# Patient Record
Sex: FEMALE | Race: ASIAN | NOT HISPANIC OR LATINO | Employment: OTHER | URBAN - METROPOLITAN AREA
[De-identification: names, ages, dates, MRNs, and addresses within clinical notes are randomized per-mention and may not be internally consistent; named-entity substitution may affect disease eponyms.]

---

## 2021-03-27 ENCOUNTER — AMBULATORY - HEALTHEAST (OUTPATIENT)
Dept: NURSING | Facility: CLINIC | Age: 56
End: 2021-03-27

## 2021-12-08 ENCOUNTER — IMMUNIZATION (OUTPATIENT)
Dept: NURSING | Facility: CLINIC | Age: 56
End: 2021-12-08
Payer: COMMERCIAL

## 2021-12-08 PROCEDURE — 91300 PR COVID VAC PFIZER DIL RECON 30 MCG/0.3 ML IM: CPT

## 2021-12-08 PROCEDURE — 0004A PR COVID VAC PFIZER DIL RECON 30 MCG/0.3 ML IM: CPT

## 2022-07-24 ENCOUNTER — HEALTH MAINTENANCE LETTER (OUTPATIENT)
Age: 57
End: 2022-07-24

## 2022-08-29 ENCOUNTER — OFFICE VISIT (OUTPATIENT)
Dept: FAMILY MEDICINE | Facility: CLINIC | Age: 57
End: 2022-08-29
Payer: COMMERCIAL

## 2022-08-29 VITALS
SYSTOLIC BLOOD PRESSURE: 131 MMHG | TEMPERATURE: 97.9 F | DIASTOLIC BLOOD PRESSURE: 81 MMHG | RESPIRATION RATE: 16 BRPM | WEIGHT: 127.4 LBS | HEART RATE: 63 BPM

## 2022-08-29 DIAGNOSIS — D50.9 MICROCYTIC ANEMIA: Primary | ICD-10-CM

## 2022-08-29 DIAGNOSIS — Z12.31 ENCOUNTER FOR SCREENING MAMMOGRAM FOR MALIGNANT NEOPLASM OF BREAST: ICD-10-CM

## 2022-08-29 DIAGNOSIS — E05.00 GRAVES DISEASE: ICD-10-CM

## 2022-08-29 DIAGNOSIS — Z11.4 ENCOUNTER FOR SCREENING FOR HIV: ICD-10-CM

## 2022-08-29 DIAGNOSIS — F41.8 DEPRESSION WITH ANXIETY: ICD-10-CM

## 2022-08-29 DIAGNOSIS — Z12.11 COLON CANCER SCREENING: ICD-10-CM

## 2022-08-29 DIAGNOSIS — Z13.220 LIPID SCREENING: ICD-10-CM

## 2022-08-29 DIAGNOSIS — Z23 ENCOUNTER FOR IMMUNIZATION: ICD-10-CM

## 2022-08-29 DIAGNOSIS — E55.9 VITAMIN D DEFICIENCY: ICD-10-CM

## 2022-08-29 PROBLEM — E05.90 HYPERTHYROIDISM: Status: ACTIVE | Noted: 2019-10-03

## 2022-08-29 PROBLEM — F51.01 INSOMNIA, IDIOPATHIC: Status: ACTIVE | Noted: 2022-07-13

## 2022-08-29 LAB
ALBUMIN SERPL BCG-MCNC: 4.4 G/DL (ref 3.5–5.2)
ALP SERPL-CCNC: 117 U/L (ref 35–104)
ALT SERPL W P-5'-P-CCNC: 24 U/L (ref 10–35)
ANION GAP SERPL CALCULATED.3IONS-SCNC: 10 MMOL/L (ref 7–15)
AST SERPL W P-5'-P-CCNC: 33 U/L (ref 10–35)
BILIRUB SERPL-MCNC: 0.9 MG/DL
BUN SERPL-MCNC: 8.8 MG/DL (ref 6–20)
CALCIUM SERPL-MCNC: 10.1 MG/DL (ref 8.6–10)
CHLORIDE SERPL-SCNC: 103 MMOL/L (ref 98–107)
CHOLEST SERPL-MCNC: 190 MG/DL
CREAT SERPL-MCNC: 0.55 MG/DL (ref 0.51–0.95)
DEPRECATED HCO3 PLAS-SCNC: 28 MMOL/L (ref 22–29)
ERYTHROCYTE [DISTWIDTH] IN BLOOD BY AUTOMATED COUNT: 17.8 % (ref 10–15)
FERRITIN SERPL-MCNC: 109 NG/ML (ref 11–328)
GFR SERPL CREATININE-BSD FRML MDRD: >90 ML/MIN/1.73M2
GLUCOSE SERPL-MCNC: 104 MG/DL (ref 70–99)
HCT VFR BLD AUTO: 43.3 % (ref 35–47)
HDLC SERPL-MCNC: 50 MG/DL
HGB BLD-MCNC: 14.6 G/DL (ref 11.7–15.7)
IRON BINDING CAPACITY (ROCHE): 321 UG/DL (ref 240–430)
IRON SATN MFR SERPL: 31 % (ref 15–46)
IRON SERPL-MCNC: 101 UG/DL (ref 37–145)
LDLC SERPL CALC-MCNC: 112 MG/DL
MCH RBC QN AUTO: 26.3 PG (ref 26.5–33)
MCHC RBC AUTO-ENTMCNC: 33.7 G/DL (ref 31.5–36.5)
MCV RBC AUTO: 78 FL (ref 78–100)
NONHDLC SERPL-MCNC: 140 MG/DL
PLATELET # BLD AUTO: 139 10E3/UL (ref 150–450)
POTASSIUM SERPL-SCNC: 4.4 MMOL/L (ref 3.4–5.3)
PROT SERPL-MCNC: 7.6 G/DL (ref 6.4–8.3)
RBC # BLD AUTO: 5.55 10E6/UL (ref 3.8–5.2)
SODIUM SERPL-SCNC: 141 MMOL/L (ref 136–145)
T4 FREE SERPL-MCNC: 2 NG/DL (ref 0.9–1.7)
TRIGL SERPL-MCNC: 138 MG/DL
WBC # BLD AUTO: 4.7 10E3/UL (ref 4–11)

## 2022-08-29 PROCEDURE — 83550 IRON BINDING TEST: CPT | Performed by: FAMILY MEDICINE

## 2022-08-29 PROCEDURE — 84443 ASSAY THYROID STIM HORMONE: CPT | Performed by: FAMILY MEDICINE

## 2022-08-29 PROCEDURE — 83540 ASSAY OF IRON: CPT | Performed by: FAMILY MEDICINE

## 2022-08-29 PROCEDURE — 99203 OFFICE O/P NEW LOW 30 MIN: CPT | Performed by: FAMILY MEDICINE

## 2022-08-29 PROCEDURE — 80053 COMPREHEN METABOLIC PANEL: CPT | Performed by: FAMILY MEDICINE

## 2022-08-29 PROCEDURE — 87389 HIV-1 AG W/HIV-1&-2 AB AG IA: CPT | Performed by: FAMILY MEDICINE

## 2022-08-29 PROCEDURE — 82306 VITAMIN D 25 HYDROXY: CPT | Performed by: FAMILY MEDICINE

## 2022-08-29 PROCEDURE — 83021 HEMOGLOBIN CHROMOTOGRAPHY: CPT | Performed by: FAMILY MEDICINE

## 2022-08-29 PROCEDURE — 85027 COMPLETE CBC AUTOMATED: CPT | Performed by: FAMILY MEDICINE

## 2022-08-29 PROCEDURE — 82728 ASSAY OF FERRITIN: CPT | Performed by: FAMILY MEDICINE

## 2022-08-29 PROCEDURE — 36415 COLL VENOUS BLD VENIPUNCTURE: CPT | Performed by: FAMILY MEDICINE

## 2022-08-29 PROCEDURE — 80061 LIPID PANEL: CPT | Performed by: FAMILY MEDICINE

## 2022-08-29 PROCEDURE — 83020 HEMOGLOBIN ELECTROPHORESIS: CPT | Performed by: FAMILY MEDICINE

## 2022-08-29 PROCEDURE — 84439 ASSAY OF FREE THYROXINE: CPT | Performed by: FAMILY MEDICINE

## 2022-08-29 RX ORDER — FERROUS SULFATE 325(65) MG
TABLET ORAL
COMMUNITY
Start: 2022-07-13 | End: 2022-09-13

## 2022-08-29 RX ORDER — TRAZODONE HYDROCHLORIDE 50 MG/1
TABLET, FILM COATED ORAL
COMMUNITY
Start: 2022-07-13 | End: 2023-02-09 | Stop reason: ALTCHOICE

## 2022-08-29 RX ORDER — MELOXICAM 15 MG/1
TABLET ORAL
COMMUNITY
Start: 2022-08-06 | End: 2023-02-09

## 2022-08-29 RX ORDER — METOPROLOL SUCCINATE 25 MG/1
TABLET, EXTENDED RELEASE ORAL
COMMUNITY
Start: 2022-07-18 | End: 2022-08-29

## 2022-08-29 RX ORDER — CETIRIZINE HYDROCHLORIDE 10 MG/1
10 TABLET ORAL
COMMUNITY
End: 2023-02-09

## 2022-08-29 RX ORDER — MIRTAZAPINE 7.5 MG/1
7.5 TABLET, FILM COATED ORAL AT BEDTIME
Qty: 90 TABLET | Refills: 1 | Status: SHIPPED | OUTPATIENT
Start: 2022-08-29 | End: 2023-02-09

## 2022-08-29 RX ORDER — METHIMAZOLE 10 MG/1
TABLET ORAL
COMMUNITY
Start: 2022-07-14 | End: 2023-07-18

## 2022-08-29 RX ORDER — FLUOXETINE 10 MG/1
CAPSULE ORAL
COMMUNITY
Start: 2022-07-13 | End: 2022-08-29

## 2022-08-29 RX ORDER — ACETAMINOPHEN 160 MG
TABLET,DISINTEGRATING ORAL
COMMUNITY
Start: 2022-07-13 | End: 2023-02-09

## 2022-08-29 ASSESSMENT — PATIENT HEALTH QUESTIONNAIRE - PHQ9
SUM OF ALL RESPONSES TO PHQ QUESTIONS 1-9: 11
10. IF YOU CHECKED OFF ANY PROBLEMS, HOW DIFFICULT HAVE THESE PROBLEMS MADE IT FOR YOU TO DO YOUR WORK, TAKE CARE OF THINGS AT HOME, OR GET ALONG WITH OTHER PEOPLE: SOMEWHAT DIFFICULT
SUM OF ALL RESPONSES TO PHQ QUESTIONS 1-9: 11

## 2022-08-29 NOTE — PROGRESS NOTES
[facility] IN-OFFICE Visit  In Person : Sammi     Assessment/Plan:  Microcytic anemia  Continue iron supplement for now and continue workup as below.  Pt declines colonoscopy as recommended but will do a cologard.  Consider hemoccult as well to see if blood in stool.    - CBC with platelets  - Hemoglobinopathy/Thalassemia Cascade  - Ferritin  - Iron and iron binding capacity  - Comprehensive metabolic panel (BMP + Alb, Alk Phos, ALT, AST, Total. Bili, TP)  - COLOGUARD([employer])  - CBC with platelets  - Hemoglobinopathy/Thalassemia Cascade  - Ferritin  - Iron and iron binding capacity  - Comprehensive metabolic panel (BMP + Alb, Alk Phos, ALT, AST, Total. Bili, TP)    Vitamin D deficiency  Check level and replace as needed  - Vitamin D Deficiency  - Vitamin D Deficiency    Graves disease  Back on methimazole 10mg daily with improved symptoms.  Follow-up with endocrine later this year- labs today as indicated.    - TSH  - T4, free  - TSH  - T4, free    Encounter for screening mammogram for malignant neoplasm of breast  - *MA Screening Digital Bilateral    Colon cancer screening  As above- cologard    Lipid screening  - Lipid Profile (Chol, Trig, HDL, LDL calc)  - Lipid Profile (Chol, Trig, HDL, LDL calc)    Encounter for screening for HIV  - HIV Antigen Antibody Combo  - HIV Antigen Antibody Combo    Depression with anxiety  Hasn't seen mental health prescriber in over a year due to  and ride issues.  Increase fluoxetine from 10 to 20mg daily.  Add remeron to bedtime due to depressed symptoms, poor sleep and poor appetite requesting help with appetite today.    - FLUoxetine (PROZAC) 20 MG capsule  Dispense: 90 capsule; Refill: 4  - mirtazapine (REMERON) 7.5 MG tablet  Dispense: 90 tablet; Refill: 1  - Med Therapy Management Referral    Encounter for immunization  Pt declined covid booster, shingrix, adacel, flu offered today.       Return in about 3 months (around  11/29/2022) for Health Maintenance Visit.        Subjective   Norberto Gunter is a 57 year old accompanied by her daughter, presenting for the following health issues:  RECHECK (Pt is here today for follow up on her blood counts)  doctor at [facility] has retired and she needs a new PCP   Also see's Dr Josiah Vela for her thyroid - FM at [facility]     Has been back on her methimazole 10mg daily for 2.5 months now.    Taking iron daily for past couple of months as well because she was told she has low iron and anemia.      Notes she took her 1 month of metoprolol and now her heart is better now that she restarted her thyroid pill so she stopped taking this one now.      Takes meloxicam 15mg as needed for body aches and pain- takes 3-4 days/week only 1/day.      Does take OTC allergy meds as needed.      Still taking depression med and sleep med as needed.  Sees a therapist on [employer] and [place] but hasn't really seen anyone for her med management since 2021 due to phone and  issues.      Has follow-up with endocrine with ike 12/2022    Declines covid booster today     History of Present Illness       Reason for visit:  Just a check up on a new symptom I ve been feeling.  Symptom onset:  3-4 weeks ago    She eats 4 or more servings of fruits and vegetables daily.She consumes 3 sweetened beverage(s) daily.She exercises with enough effort to increase her heart rate 20 to 29 minutes per day.  She exercises with enough effort to increase her heart rate 3 or less days per week.   She is taking medications regularly.    Today's PHQ-9         PHQ-9 Total Score: 11    PHQ-9 Q9 Thoughts of better off dead/self-harm past 2 weeks :   Not at all    How difficult have these problems made it for you to do your work, take care of things at home, or get along with other people: Somewhat difficult     Review of Systems       Objective    /81 (BP Location: Right arm, Patient Position: Sitting, Cuff Size: Adult  Regular)   Pulse 63   Temp 97.9  F (36.6  C)   Resp 16   Wt 57.8 kg (127 lb 6.4 oz)   LMP  (Approximate)   There is no height or weight on file to calculate BMI.  Physical Exam   Wt Readings from Last 3 Encounters:   08/29/22 57.8 kg (127 lb 6.4 oz)       No LMP recorded (approximate). (Menstrual status: Irregular Periods).    All normal as below except abnormalities include: grossly normal exam  Pterygium noted- not crossing pupil.  General is a  57 year old sitting comfortably in no apparent distress wearing a mask   HEENT:  TM are clear bilaterally.  Eye exams within normal   Neck: Supple without lymphadenopathy or thyromegally  CV: Regular rate and rhythm S1S2 without rubs, murmurs or gallops,   Lungs: Clear to auscultation bilaterally  Abd:  +BS, soft NT/ND,  No masses or organomegally,   Extremities: Warm, No Edema, 2+ Pedal and radial pulses bilaterally  Skin: No lesions or rashes noted  Neuro: Able to ambulate around the exam room with equal movement, strength and normal coordination of the upper and lower extremeties symmetrically    History summarized from1-2:Cardiology visit 7/18/22 with Wilfredina reviewed- seen for palpitations in setting of Grave's disease and started on metoprolol xl 1/2 of 25mg daily.  Acute marylou 2019 and found to have a. Fib.  Echo showed biatrial enlargement with EF 50-55%.  Hyperthyroid dx made at this time.  Started on asa and b-blocker.  Methimazole started.  Follow-up 2021 showed bradycardia so b-blocker was stopped and asa stopped due to low Chads2 score.  7/2022 seen by pcp and found to be hyperthyroid again with symptoms.  Restarted on methimazole and asked to follow-up with endocrine. She was having increased SOB and palpitations that get better on thyroid pill. Consider holter monitor in future to see if she does go into afib at times.       Old Records-1: Outside allergies, meds, problems and immunizations were reconciled as needed from [facility]  Lab tests  reviewed-1: hgb 10.7 with MCV 76     Nava Tran MD              .  ..

## 2022-08-30 LAB
DEPRECATED CALCIDIOL+CALCIFEROL SERPL-MC: 38 UG/L (ref 20–75)
HIV 1+2 AB+HIV1 P24 AG SERPL QL IA: NONREACTIVE

## 2022-09-02 LAB
HEMOGLOBIN A2 QUANTITATION: 2.8 % (ref 2.2–3.5)
HEMOGLOBIN ELECTROPHRESIS: NORMAL
HEMOGLOBIN F QUANTITATION: <0.5 % (ref 0–2)
REVIEWING PATHOLOGIST: NORMAL

## 2022-09-13 DIAGNOSIS — D69.6 THROMBOCYTOPENIA (H): ICD-10-CM

## 2022-09-13 DIAGNOSIS — D50.9 MICROCYTIC ANEMIA: Primary | ICD-10-CM

## 2022-09-13 RX ORDER — FERROUS SULFATE 325(65) MG
325 TABLET ORAL EVERY OTHER DAY
Qty: 45 TABLET | Refills: 0 | Status: SHIPPED | OUTPATIENT
Start: 2022-09-13 | End: 2023-02-09

## 2022-09-13 NOTE — RESULT ENCOUNTER NOTE
Team - please call patient with results and send result letter with this information if patient desires for their records.     Her anemia is better- keep eating healthy and taking her thyroid pill. She can cut back her iron to every other day and maybe stop soon if her blood is healthy with her next check.      Her vitamin D is better- keep taking her vitamin D     Her thyroid is still a little over active but better - keep taking her thyroid pill daily and see her thyroid specialist later this fall as scheduled.      Her cholesterol is healthy   Her sugar, liver and kidney tests are healthy

## 2022-09-14 ENCOUNTER — TELEPHONE (OUTPATIENT)
Dept: FAMILY MEDICINE | Facility: CLINIC | Age: 57
End: 2022-09-14

## 2022-09-14 NOTE — TELEPHONE ENCOUNTER
RN called pt with the help of a thania  regarding Dr. Tran message below. Provider message relay to pt.    Pt verbalizes understanding, agrees with plan and has no further questions.    Closing encounter.    ELPIDIO Bower, RN   [facility]    ----- Message from Nava Tran MD sent at 9/13/2022 11:30 AM CDT -----    Team - please call patient with results and send result letter with this information if patient desires for their records.     Her anemia is better- keep eating healthy and taking her thyroid pill. She can cut back her iron to every other day and maybe stop soon if her blood is healthy with her next check.      Her vitamin D is better- keep taking her vitamin D     Her thyroid is still a little over active but better - keep taking her thyroid pill daily and see her thyroid specialist later this fall as scheduled.      Her cholesterol is healthy   Her sugar, liver and kidney tests are healthy

## 2022-10-02 ENCOUNTER — HEALTH MAINTENANCE LETTER (OUTPATIENT)
Age: 57
End: 2022-10-02

## 2022-10-17 ENCOUNTER — TELEPHONE (OUTPATIENT)
Dept: FAMILY MEDICINE | Facility: CLINIC | Age: 57
End: 2022-10-17

## 2022-10-17 NOTE — TELEPHONE ENCOUNTER
MTM referral from: [facility] visit (referral by provider)    MTM referral outreach attempt #2 on October 17, 2022 at 2:08 PM      Outcome: Patient not reachable after several attempts, will route to [facility] Pharmacist/Provider as an FYI.  [facility] scheduling number is [phone].  Thank you for the referral.    Irina Pino [facility]

## 2023-01-26 ENCOUNTER — ALLIED HEALTH/NURSE VISIT (OUTPATIENT)
Dept: FAMILY MEDICINE | Facility: CLINIC | Age: 58
End: 2023-01-26
Payer: COMMERCIAL

## 2023-01-26 DIAGNOSIS — Z23 IMMUNIZATION DUE: Primary | ICD-10-CM

## 2023-01-26 PROCEDURE — 90471 IMMUNIZATION ADMIN: CPT

## 2023-01-26 PROCEDURE — 0124A COVID-19 VACCINE BIVALENT BOOSTER 12+ (PFIZER): CPT

## 2023-01-26 PROCEDURE — 91312 COVID-19 VACCINE BIVALENT BOOSTER 12+ (PFIZER): CPT

## 2023-01-26 PROCEDURE — 99207 PR NO CHARGE NURSE ONLY: CPT

## 2023-01-26 PROCEDURE — 90682 RIV4 VACC RECOMBINANT DNA IM: CPT

## 2023-02-09 ENCOUNTER — OFFICE VISIT (OUTPATIENT)
Dept: FAMILY MEDICINE | Facility: CLINIC | Age: 58
End: 2023-02-09
Payer: COMMERCIAL

## 2023-02-09 VITALS
RESPIRATION RATE: 16 BRPM | DIASTOLIC BLOOD PRESSURE: 92 MMHG | HEIGHT: 60 IN | HEART RATE: 74 BPM | SYSTOLIC BLOOD PRESSURE: 146 MMHG | TEMPERATURE: 98 F | BODY MASS INDEX: 25.52 KG/M2 | WEIGHT: 130 LBS

## 2023-02-09 DIAGNOSIS — E55.9 VITAMIN D DEFICIENCY: ICD-10-CM

## 2023-02-09 DIAGNOSIS — D69.6 THROMBOCYTOPENIA (H): ICD-10-CM

## 2023-02-09 DIAGNOSIS — E05.00 GRAVES DISEASE: ICD-10-CM

## 2023-02-09 DIAGNOSIS — F32.1 CURRENT MODERATE EPISODE OF MAJOR DEPRESSIVE DISORDER WITHOUT PRIOR EPISODE (H): Primary | ICD-10-CM

## 2023-02-09 DIAGNOSIS — R92.8 ABNORMAL MAMMOGRAM: ICD-10-CM

## 2023-02-09 DIAGNOSIS — Z11.59 NEED FOR HEPATITIS B SCREENING TEST: ICD-10-CM

## 2023-02-09 DIAGNOSIS — D50.9 MICROCYTIC ANEMIA: ICD-10-CM

## 2023-02-09 DIAGNOSIS — J30.9 ALLERGIC RHINITIS, UNSPECIFIED SEASONALITY, UNSPECIFIED TRIGGER: ICD-10-CM

## 2023-02-09 LAB
ERYTHROCYTE [DISTWIDTH] IN BLOOD BY AUTOMATED COUNT: 13.3 % (ref 10–15)
HBA1C MFR BLD: 5.8 % (ref 0–5.6)
HCT VFR BLD AUTO: 42.2 % (ref 35–47)
HGB BLD-MCNC: 14 G/DL (ref 11.7–15.7)
MCH RBC QN AUTO: 29.4 PG (ref 26.5–33)
MCHC RBC AUTO-ENTMCNC: 33.2 G/DL (ref 31.5–36.5)
MCV RBC AUTO: 89 FL (ref 78–100)
PLATELET # BLD AUTO: 149 10E3/UL (ref 150–450)
RBC # BLD AUTO: 4.76 10E6/UL (ref 3.8–5.2)
T4 FREE SERPL-MCNC: 0.88 NG/DL (ref 0.9–1.7)
TSH SERPL DL<=0.005 MIU/L-ACNC: 1.61 UIU/ML (ref 0.3–4.2)
WBC # BLD AUTO: 5.7 10E3/UL (ref 4–11)

## 2023-02-09 PROCEDURE — 87340 HEPATITIS B SURFACE AG IA: CPT | Performed by: FAMILY MEDICINE

## 2023-02-09 PROCEDURE — 84443 ASSAY THYROID STIM HORMONE: CPT | Performed by: FAMILY MEDICINE

## 2023-02-09 PROCEDURE — 84439 ASSAY OF FREE THYROXINE: CPT | Performed by: FAMILY MEDICINE

## 2023-02-09 PROCEDURE — 99214 OFFICE O/P EST MOD 30 MIN: CPT | Performed by: FAMILY MEDICINE

## 2023-02-09 PROCEDURE — 85027 COMPLETE CBC AUTOMATED: CPT | Performed by: FAMILY MEDICINE

## 2023-02-09 PROCEDURE — 83036 HEMOGLOBIN GLYCOSYLATED A1C: CPT | Performed by: FAMILY MEDICINE

## 2023-02-09 PROCEDURE — 86706 HEP B SURFACE ANTIBODY: CPT | Performed by: FAMILY MEDICINE

## 2023-02-09 PROCEDURE — 36415 COLL VENOUS BLD VENIPUNCTURE: CPT | Performed by: FAMILY MEDICINE

## 2023-02-09 RX ORDER — CETIRIZINE HYDROCHLORIDE 10 MG/1
10 TABLET ORAL DAILY PRN
Qty: 90 TABLET | Refills: 4 | Status: SHIPPED | OUTPATIENT
Start: 2023-02-09 | End: 2023-07-13

## 2023-02-09 RX ORDER — MIRTAZAPINE 7.5 MG/1
7.5 TABLET, FILM COATED ORAL AT BEDTIME
Qty: 90 TABLET | Refills: 3 | Status: SHIPPED | OUTPATIENT
Start: 2023-02-09 | End: 2023-04-10

## 2023-02-09 RX ORDER — ACETAMINOPHEN 160 MG
2000 TABLET,DISINTEGRATING ORAL DAILY
Qty: 90 CAPSULE | Refills: 4 | Status: SHIPPED | OUTPATIENT
Start: 2023-02-09 | End: 2023-07-13

## 2023-02-09 ASSESSMENT — PATIENT HEALTH QUESTIONNAIRE - PHQ9
SUM OF ALL RESPONSES TO PHQ QUESTIONS 1-9: 19
10. IF YOU CHECKED OFF ANY PROBLEMS, HOW DIFFICULT HAVE THESE PROBLEMS MADE IT FOR YOU TO DO YOUR WORK, TAKE CARE OF THINGS AT HOME, OR GET ALONG WITH OTHER PEOPLE: VERY DIFFICULT
SUM OF ALL RESPONSES TO PHQ QUESTIONS 1-9: 19

## 2023-02-09 NOTE — PROGRESS NOTES
[facility] Visit  Phone : Sammi    Assessment/Plan:  Microcytic anemia  Labs normal today- resolved.    - CBC with platelets  - CBC with platelets    Thrombocytopenia (H)  Stable and very minimal at this point- continue to monitor    - CBC with platelets  - CBC with platelets    Graves disease  Working with [facility] endocrine.  Recheck labs today and have her follow-up with endocrine.  Currently on methimazole 10mg daily  - TSH  - T4, free  - Hemoglobin A1c  - TSH  - T4, free  - Hemoglobin A1c    Need for hepatitis B screening test  - Hepatitis B Surface Antibody  - Hepatitis B surface antigen  - Hepatitis B Surface Antibody  - Hepatitis B surface antigen    Abnormal mammogram  Needs help rescheduling follow-up imaging.    - MA Diagnostic Digital Left  - US Breast Left Limited 1-3 Quadrants    Current moderate episode of major depressive disorder without prior episode (H)  Off meds for a period of time (weeks vs months?)  Restart mirtazapine 7.5mg at bedtime and  Fluoxetine 20mg daily.  Reviewed depression action plan with pt today and sent to [facility] for her family to have.    - mirtazapine (REMERON) 7.5 MG tablet  Dispense: 90 tablet; Refill: 3  - PRIMARY CARE FOLLOW-UP SCHEDULING    Vitamin D deficiency  Refill as requested- currently off  - Cholecalciferol (VITAMIN D3) 50 MCG (2000 UT) CAPS  Dispense: 90 capsule; Refill: 4    Allergic rhinitis, unspecified seasonality, unspecified trigger  Refill as requested.    - cetirizine (ZYRTEC) 10 MG tablet  Dispense: 90 tablet; Refill: 4      Return in about 6 weeks (around 3/23/2023) for Health Maintenance Visit.        Natalia Gunter is a 57 year old accompanied by her self, presenting for the following health issues:  RECHECK (Pt here for follow for thyroid.)    Due to recheck thyroid labs- taking her thyroid pill every day.  Methimazole 10mg daily.     Wants help getting in with a mental health provider.      Ran out of her  mental health meds about 2 weeks ago- wants refills as they were helping her.  Pt notes she has 1 pill that she takes at bedtime.  Not sure what it is.  It is the medication that I gave to her.  Has been helping with sleep.  Thinks she ran out of her depression pill- fluoxetine daily.      Iron- not sure if she is taking this one or not.  Thinks she ran out of these.       Was supposed to get anoh    History of Present Illness       Reason for visit:  Thyroid    She eats 2-3 servings of fruits and vegetables daily.She consumes 0 sweetened beverage(s) daily.She exercises with enough effort to increase her heart rate 9 or less minutes per day.  She exercises with enough effort to increase her heart rate 3 or less days per week.   She is taking medications regularly.    Today's PHQ-9         PHQ-9 Total Score: 19    PHQ-9 Q9 Thoughts of better off dead/self-harm past 2 weeks :   Several days  Thoughts of suicide or self harm: (P) No  Self-harm Plan:     Self-harm Action:       Safety concerns for self or others: (P) No    How difficult have these problems made it for you to do your work, take care of things at home, or get along with other people: Very difficult         Depression Screening Follow Up    PHQ 2/9/2023   PHQ-9 Total Score 19   Q9: Thoughts of better off dead/self-harm past 2 weeks Several days   F/U: Thoughts of suicide or self-harm -   F/U: Safety concerns -           Follow Up      Follow Up Actions Taken  Crisis resource information provided in the After Visit Summary  restart medications- fu in 6 weeks    Discussed the following ways the patient can remain in a safe environment:  be around others  Depression Screening Follow-up    PHQ 2/9/2023   PHQ-9 Total Score 19   Q9: Thoughts of better off dead/self-harm past 2 weeks Several days   F/U: Thoughts of suicide or self-harm -   F/U: Safety concerns -       Does the patient currently have a mental health provider?  No  Follow Up Actions Taken  Mental  "Health Referral pended/placed     Nava Tran MD      Review of Systems         Objective    BP (!) 146/92 (BP Location: Left arm, Patient Position: Sitting, Cuff Size: Adult Regular)   Pulse 74   Temp 98  F (36.7  C)   Resp 16   Ht 1.52 m (4' 11.84\")   Wt 59 kg (130 lb)   BMI 25.52 kg/m    Body mass index is 25.52 kg/m .  Physical Exam   Wt Readings from Last 3 Encounters:   02/09/23 59 kg (130 lb)   08/29/22 57.8 kg (127 lb 6.4 oz)       No LMP recorded. Patient is premenopausal.    All normal as below except abnormalities include: pt appears well.  Good eye contact.  Dressed appropriately for season and occaision with evidence of healthy grooming.    General is a  57 year old sitting comfortably in no apparent distress wearing a mask   HEENT:   Eye exams within normal   Neck: Supple without lymphadenopathy or thyromegally  Extremities: Warm, No Edema, 2+ Pedal and radial pulses bilaterally  Skin: No lesions or rashes noted  Neuro: Able to ambulate around the exam room with equal movement, strength and normal coordination of the upper and lower extremeties symmetrically    History summarized from1-2:Endocrine visit at [facility] fall 2022- thyroid level is almost back to normal.  Recheck in 2months.    Old Records-1: Outside allergies, meds, problems and immunizations were reconciled as needed from [facility]  Radiology tests reviewed-1: Mammogram with incomplete images- due for recheck.    Lab tests reviewed-1: 2022    Nava Tran MD                  "

## 2023-02-09 NOTE — LETTER
My Depression Action Plan  Name: Norberto Onofre   Date of Birth 1965  Date: 2/14/2023    My doctor: Nava Tran   My clinic: [facility] [place] [id number] [phone]          GREEN    ZONE   Good Control    What it looks like:     Things are going generally well. You have normal ups and downs. You may even feel depressed from time to time, but bad moods usually last less than a day.   What you need to do:  1. Continue to care for yourself (see self care plan)  2. Check your depression survival kit and update it as needed  3. Follow your physician s recommendations including any medication.  4. Do not stop taking medication unless you consult with your physician first.           YELLOW         ZONE Getting Worse    What it looks like:     Depression is starting to interfere with your life.     It may be hard to get out of bed; you may be starting to isolate yourself from others.    Symptoms of depression are starting to last most all day and this has happened for several days.     You may have suicidal thoughts but they are not constant.   What you need to do:     1. Call your care team. Your response to treatment will improve if you keep your care team informed of your progress. Yellow periods are signs an adjustment may need to be made.     2. Continue your self-care.  Just get dressed and ready for the day.  Don't give yourself time to talk yourself out of it.    3. Talk to someone in your support network.    4. Open up your Depression Self-Care Plan/Wellness Kit.           RED    ZONE Medical Alert - Get Help    What it looks like:     Depression is seriously interfering with your life.     You may experience these or other symptoms: You can t get out of bed most days, can t work or engage in other necessary activities, you have trouble taking care of basic hygiene, or basic responsibilities, thoughts of suicide or death that will not go away,  self-injurious behavior.     What you need to do:  1. Call your care team and request a same-day appointment. If they are not available (weekends or after hours) call your local crisis line, emergency room or 911.          Depression Self-Care Plan / Wellness Kit    Many people find that medication and therapy are helpful treatments for managing depression. In addition, making small changes to your everyday life can help to boost your mood and improve your wellbeing. Below are some tips for you to consider. Be sure to talk with your medical provider and/or behavioral health consultant if your symptoms are worsening or not improving.     Sleep   Sleep hygiene  means all of the habits that support good, restful sleep. It includes maintaining a consistent bedtime and wake time, using your bedroom only for sleeping or sex, and keeping the bedroom dark and free of distractions like a computer, smartphone, or television.     Develop a Healthy Routine  Maintain good hygiene. Get out of bed in the morning, make your bed, brush your teeth, take a shower, and get dressed. Don t spend too much time viewing media that makes you feel stressed. Find time to relax each day.    Exercise  Get some form of exercise every day. This will help reduce pain and release endorphins, the  feel good  chemicals in your brain. It can be as simple as just going for a walk or doing some gardening, anything that will get you moving.      Diet  Strive to eat healthy foods, including fruits and vegetables. Drink plenty of water. Avoid excessive sugar, caffeine, alcohol, and other mood-altering substances.     Stay Connected with Others  Stay in touch with friends and family members.    Manage Your Mood  Try deep breathing, massage therapy, biofeedback, or meditation. Take part in fun activities when you can. Try to find something to smile about each day.     Psychotherapy  Be open to working with a therapist if your provider recommends it.      Medication  Be sure to take your medication as prescribed. Most anti-depressants need to be taken every day. It usually takes several weeks for medications to work. Not all medicines work for all people. It is important to follow-up with your provider to make sure you have a treatment plan that is working for you. Do not stop your medication abruptly without first discussing it with your provider.    Crisis Resources   These hotlines are for both adults and children. They and are open 24 hours a day, 7 days a week unless noted otherwise.      National Suicide Prevention Lifeline   988 or 7-237-941-ETJM (4617)      Crisis Text Line    www.crisistextline.org  Text HOME to 709835 from anywhere in the [place], anytime, about any type of crisis. A live, trained crisis counselor will receive the text and respond quickly.      [place] Lifeline for LGBTQ Youth  A national crisis intervention and suicide lifeline for LGBTQ youth under 25. Provides a safe place to talk without judgement. Call 1-396.338.2924; text START to 409555 or visit www.thetrevorproject.org to talk to a trained counselor.      For [facility] crisis numbers, visit the [facility] website at:  https://[place].gov/dhs/people-we-serve/adults/health-care/mental-health/resources/crisis-contacts.jsp

## 2023-02-09 NOTE — LETTER
February 14, 2023      Norberto Onofre  [address]  [place] [address]        Dear ,    We are writing to inform you of your test results.    Sorry I had to leave your visit suddenly to go deliver a baby last week.     Your thyroid is better now - stay on your same dose and see your thyroid doctor as scheduled.       Your blood counts are healthy- no anemia.       No diabetes but you should eat very healthy so that you don't get diabetes in the future- lots of fruit/veggies and low fat protein, less rice/noodles/bread/potatoes/sweets       Resulted Orders   Hepatitis B Surface Antibody   Result Value Ref Range    Hepatitis B Surface Antibody Instrument Value 30.86 <8.00 m[IU]/mL    Hepatitis B Surface Antibody Reactive       Comment:      Patient is considered to be immune to infection with hepatitis B when the value is greater than or equal to 12.00 mIU/mL.   Hepatitis B surface antigen   Result Value Ref Range    Hepatitis B Surface Antigen Nonreactive Nonreactive   CBC with platelets   Result Value Ref Range    WBC Count 5.7 4.0 - 11.0 10e3/uL    RBC Count 4.76 3.80 - 5.20 10e6/uL    Hemoglobin 14.0 11.7 - 15.7 g/dL    Hematocrit 42.2 35.0 - 47.0 %    MCV 89 78 - 100 fL    MCH 29.4 26.5 - 33.0 pg    MCHC 33.2 31.5 - 36.5 g/dL    RDW 13.3 10.0 - 15.0 %    Platelet Count 149 (L) 150 - 450 10e3/uL   TSH   Result Value Ref Range    TSH 1.61 0.30 - 4.20 uIU/mL   T4, free   Result Value Ref Range    Free T4 0.88 (L) 0.90 - 1.70 ng/dL   Hemoglobin A1c   Result Value Ref Range    Hemoglobin A1C 5.8 (H) 0.0 - 5.6 %      Comment:      Normal <5.7%   Prediabetes 5.7-6.4%    Diabetes 6.5% or higher     Note: Adopted from ADA consensus guidelines.       If you have any questions or concerns, please call the clinic at the number listed above.       Sincerely,      Nava Tran MD

## 2023-02-10 LAB
HBV SURFACE AB SERPL IA-ACNC: 30.86 M[IU]/ML
HBV SURFACE AB SERPL IA-ACNC: REACTIVE M[IU]/ML
HBV SURFACE AG SERPL QL IA: NONREACTIVE

## 2023-02-14 PROBLEM — D50.9 MICROCYTIC ANEMIA: Status: RESOLVED | Noted: 2022-07-13 | Resolved: 2023-02-14

## 2023-02-14 NOTE — RESULT ENCOUNTER NOTE
Team - please call patient with results and send result letter with this information if patient desires for their records.     Sorry I had to leave her visit suddenly to go deliver a baby last week.     Her thyroid is better now - stay on her same dose and see her thyroid doctor as scheduled.      Her blood counts are healthy- no anemia.      No diabetes but she should eat very healthy so that she doesn't get diabetes in the future- lots of fruit/veggies and low fat protein, less rice/noodles/bread/potatoes/sweets

## 2023-03-24 LAB — TSH SERPL DL<=0.005 MIU/L-ACNC: <0.01 UIU/ML (ref 0.3–4.2)

## 2023-04-10 ENCOUNTER — TELEPHONE (OUTPATIENT)
Dept: FAMILY MEDICINE | Facility: CLINIC | Age: 58
End: 2023-04-10

## 2023-04-10 ENCOUNTER — OFFICE VISIT (OUTPATIENT)
Dept: FAMILY MEDICINE | Facility: CLINIC | Age: 58
End: 2023-04-10
Payer: COMMERCIAL

## 2023-04-10 ENCOUNTER — LAB (OUTPATIENT)
Dept: FAMILY MEDICINE | Facility: CLINIC | Age: 58
End: 2023-04-10

## 2023-04-10 VITALS
TEMPERATURE: 97.4 F | RESPIRATION RATE: 16 BRPM | HEART RATE: 68 BPM | BODY MASS INDEX: 24.59 KG/M2 | HEIGHT: 60 IN | WEIGHT: 125.25 LBS | OXYGEN SATURATION: 97 % | DIASTOLIC BLOOD PRESSURE: 86 MMHG | SYSTOLIC BLOOD PRESSURE: 137 MMHG

## 2023-04-10 DIAGNOSIS — H60.502 ACUTE OTITIS EXTERNA OF LEFT EAR, UNSPECIFIED TYPE: ICD-10-CM

## 2023-04-10 DIAGNOSIS — Z12.11 SCREEN FOR COLON CANCER: ICD-10-CM

## 2023-04-10 DIAGNOSIS — Z63.6 CAREGIVER BURDEN: ICD-10-CM

## 2023-04-10 DIAGNOSIS — F32.1 CURRENT MODERATE EPISODE OF MAJOR DEPRESSIVE DISORDER WITHOUT PRIOR EPISODE (H): ICD-10-CM

## 2023-04-10 DIAGNOSIS — E05.90 HYPERTHYROIDISM: Primary | ICD-10-CM

## 2023-04-10 PROCEDURE — 99214 OFFICE O/P EST MOD 30 MIN: CPT | Mod: 25 | Performed by: FAMILY MEDICINE

## 2023-04-10 RX ORDER — CIPROFLOXACIN AND DEXAMETHASONE 3; 1 MG/ML; MG/ML
SUSPENSION/ DROPS AURICULAR (OTIC)
COMMUNITY
Start: 2023-03-22 | End: 2023-04-10

## 2023-04-10 RX ORDER — FLUTICASONE PROPIONATE 50 MCG
1 SPRAY, SUSPENSION (ML) NASAL 2 TIMES DAILY
Qty: 16 G | Refills: 0 | Status: SHIPPED | OUTPATIENT
Start: 2023-04-10 | End: 2023-08-15

## 2023-04-10 RX ORDER — MIRTAZAPINE 15 MG/1
15 TABLET, FILM COATED ORAL AT BEDTIME
Qty: 90 TABLET | Refills: 3 | Status: SHIPPED | OUTPATIENT
Start: 2023-04-10 | End: 2023-07-13

## 2023-04-10 RX ORDER — ACETAMINOPHEN 500 MG
TABLET ORAL
COMMUNITY
Start: 2023-03-22

## 2023-04-10 RX ORDER — IBUPROFEN 800 MG/1
TABLET, FILM COATED ORAL
COMMUNITY
Start: 2023-03-22 | End: 2023-04-10

## 2023-04-10 SDOH — SOCIAL STABILITY - SOCIAL INSECURITY: DEPENDENT RELATIVE NEEDING CARE AT HOME: Z63.6

## 2023-04-10 ASSESSMENT — ENCOUNTER SYMPTOMS
SINUS PAIN: 0
CHILLS: 0
SHORTNESS OF BREATH: 0
DIARRHEA: 0
FATIGUE: 0
CONSTIPATION: 0
COUGH: 0
FEVER: 0

## 2023-04-10 NOTE — Clinical Note
Pt never received cologaurd test ordered previously   [place] so deliveries need to go to back door.  Please help pt make sure she gets the package and help with directions on how to collect and deliver back her specimen correctly

## 2023-04-10 NOTE — TELEPHONE ENCOUNTER
Contacted [employer] [employer] and patient has an active Cologuard order from 8/29/22. Order placed today maybe denied as she already has an active order.  Patient should receive her Cologuard kit in 7 - 10 days.  [employer] will contact patient and go over instructions with her using an .    Message routed to Dr Tran for FELICIANO Magallanes RN  [facility]

## 2023-04-10 NOTE — PROGRESS NOTES
Assessment & Plan     Hyperthyroidism  Stable. Recheck at next visit and continue methimazole 10 mg daily.   - PRIMARY CARE FOLLOW-UP SCHEDULING    Acute otitis externa of left ear, unspecified type  Improved. Most symptoms have resolved after use of Ciprodex. Anticipate symptoms will continue to improve. Trial of Flonase to reduce symptoms of fullness/noises in ear. On exam left ear appeared to be healing well, TM intact and no visible drainage. Follow up if symptoms worsen or fail to improve.   - fluticasone (FLONASE) 50 MCG/ACT nasal spray  Dispense: 16 g; Refill: 0    Current moderate episode of major depressive disorder without prior episode (H)      2/9/2023     1:49 PM 2/9/2023     1:58 PM 4/10/2023    12:36 PM   PHQ   PHQ-9 Total Score 19 19 19   Q9: Thoughts of better off dead/self-harm past 2 weeks Several days Several days Not at all   F/U: Thoughts of suicide or self-harm No     F/U: Safety concerns No     Not well controlled. Patient agreeable with plan to increase mirtazapine to 15 mg daily. Continue with Fluoxetine 20 mg daily. Did not receive a call to schedule with mental health after the referral was placed for psychology at last visit. Will have scheduling help to get this appointment scheduled. Seek care for any suicidal ideation.  - Adult Mental Health  Referral  - mirtazapine (REMERON) 15 MG tablet  Dispense: 90 tablet; Refill: 3    Caregiver burden  Son has complex care needs related to an fall he had as a child. Has trouble getting rides to appointments. Was recommended to ask provider for health getting additional resources for her son. Referral placed for care guide assistance.  - Primary Care - Care Coordination Referral    Screen for colon cancer  Last package did not make it to the patient- [place] packages get [place]. Clinic staff to help assist patient in getting the cologuard.   - COLOGUARD([employer])    Follow up in 3 months for an annual health  "maintenance exam or sooner as needed.    Subjective    Norberto Gunter is a 57 year old, presenting for the following health issues:  F/U (Thyroid, ED/)        4/10/2023    12:32 PM   Additional Questions   Roomed by Jcarlos HENRIQUEZ   Accompanied by self     History of Present Illness       Hypothyroidism:     Since last visit, patient describes the following symptoms::  None    Reason for visit:  F/U thyroid/ear issue    She eats 2-3 servings of fruits and vegetables daily.She consumes 0 sweetened beverage(s) daily.She exercises with enough effort to increase her heart rate 9 or less minutes per day.  She exercises with enough effort to increase her heart rate 3 or less days per week.   She is taking medications regularly.    Today's PHQ-9         PHQ-9 Total Score: 19    PHQ-9 Q9 Thoughts of better off dead/self-harm past 2 weeks :   Not at all    How difficult have these problems made it for you to do your work, take care of things at home, or get along with other people: Very difficult  Interested in seeing a therapist.    Has been taking depression med at night and finds this helpful and wants refills on this. Also taking fluoxetine depression med   has a card from [facility] - has seen Doris Epperson but over 2 years since last visit.      Has a handwritten note with [place] [facility] Waiver [phone]  Guardianship through courts for her son who has severe developmental delay related to a head injury as a child.      Ear pain, started several weeks ago w/ discharge  Was seen in the ER at [facility] for this 3/22/23- treated with Cipro ear drops for otitis externa  Used the ear drops for the duration of the prescription  Now is not having pain but still feels \"air blowing through\" ears are \"making weird noises\"  Is no longer having discharge or pain for the past several days  Denies hearing loss    Hyperthyroidism  Taking medication daily without side effects    Reviewed vaccines due today- adacel and shingrix   Declines today- " "just was very sick and wants to wait     cologaurd - doesn't think she ever got the kit for this that she can remember.      Past Medical History:   Diagnosis Date     Paroxysmal atrial fibrillation (H)        Past Surgical History:   Procedure Laterality Date      SECTION       CHOLECYSTECTOMY         History reviewed. No pertinent family history.    Social History     Tobacco Use     Smoking status: Never     Smokeless tobacco: Never   Vaping Use     Vaping status: Never Used   Substance Use Topics     Alcohol use: No         Hospital Follow-up Visit:    Hospital/Nursing Home/IP Rehab Facility: [facility]  Date of ED visit: 3/22/23  Reason(s) for Admission: N/a discharged with antibiotic drops from ED    Was your hospitalization related to COVID-19? No   Problems taking medications regularly:  None  Medication changes since discharge: None  Problems adhering to non-medication therapy:  None    Summary of hospitalization:  [facility] discharge summary reviewed  Diagnostic Tests/Treatments reviewed.  Follow up needed: none  Other Healthcare Providers Involved in Patient s Care:         None  Update since discharge: improved.         Plan of care communicated with patient       Review of Systems   Constitutional: Negative for chills, fatigue and fever.   HENT: Negative for sinus pain and tinnitus.         \"feels like air is blowing in my ear\"   Respiratory: Negative for cough and shortness of breath.    Cardiovascular: Negative for chest pain and peripheral edema.   Gastrointestinal: Negative for constipation and diarrhea.   Endocrine: Negative for cold intolerance and heat intolerance.   Psychiatric/Behavioral:        +Depression, caregiver burden          Objective    /86 (BP Location: Left arm, Patient Position: Sitting, Cuff Size: Adult Large)   Pulse 68   Temp 97.4  F (36.3  C) (Temporal)   Resp 16   Ht 1.53 m (5' 0.24\")   Wt 56.8 kg (125 lb 4 oz)   SpO2 97%   BMI 24.27 " kg/m    Body mass index is 24.27 kg/m .     Physical Exam   Wt Readings from Last 3 Encounters:   04/10/23 56.8 kg (125 lb 4 oz)   02/09/23 59 kg (130 lb)   08/29/22 57.8 kg (127 lb 6.4 oz)       No LMP recorded. Patient is premenopausal.    All normal as below except abnormalities include: scab in external canal of left ear, otherwise grossly normal exam.  General is a  57 year old sitting comfortably in no apparent distress wearing a mask   HEENT:  TM are intact with light reflex present and clear bilaterally.  Eye exams within normal   Neck: Supple without lymphadenopathy or thyromegally  CV: Regular rate and rhythm S1S2 without rubs, murmurs or gallops,   Lungs: Clear to auscultation bilaterally  Psych: Affect flat, speech clear.         History summarized from1-2: 3/22/23 ED visit treated for acute otitis externa with ciprodex x 7 days.  Old Records-1: Outside allergies, meds, problems and immunizations were reconciled as needed from CareEverywhere  Lab tests reviewed-1: 2/9/23: a1c 5.8, T4 0.88, TSH 1.61, platelets 149 otherwise normal CBC.        REESE AckermanN, RN, SNP    Nava Tran MD  [facility]    Physician Attestation   I, Nava Tran, saw this patient and have discussed and personally reviewed information outlined in this document.    I agree with the findings and plan of care as documented in the note.      Nava Tran MD

## 2023-04-10 NOTE — Clinical Note
Pt has worked with Doris Epperson at [facility] for mental health therapy in the past and would like to reestablish care with her again if possible.

## 2023-04-11 ENCOUNTER — PATIENT OUTREACH (OUTPATIENT)
Dept: CARE COORDINATION | Facility: CLINIC | Age: 58
End: 2023-04-11
Payer: COMMERCIAL

## 2023-04-11 NOTE — PROGRESS NOTES
Clinic Care Coordination Contact:  Community Health Worker Initial Outreach    Today's date: 4/11/2023    Reason: CCC CHW Initial Outreach Called- Referral to Clinic Care Coordination (CCC) Service  Referral provider/name:  Note attached per referral reviewed;     CHW Initial Information Gathering:  Referral Source: PCP  Preferred Hospital: [facility]  [phone]  Preferred Urgent Care: [facility], [phone]  Current living arrangement:: I live in a private home with family  Type of residence:: Private home - stairs  Informal Support system:: Family (Pt shared; I drive to medical appt. I bring my son Joey to his medical appt.)  Transportation means:: Family    Care Mgmt (GEN) screening:   -Fallen 2 or more times in the past year? No per pt.  -Any fall with injury in the past year? No per pt      Patient accepts CC: Yes. Patient scheduled for assessment with CCC  on 4/14/2023 at 10:00AM. Patient noted desire to discuss legal guardianship and representtive payee programs.     Potential Patient Outreach Discussion:   Attempted #: Patient was identified as a potential candidate and referred to Clinic Care Coordination Service. CHW Sheridan call and spoke with patient today, 4/11/2023 to discuss possible clinic care coordination enrollment. Have introduced myself to patient. Clinic care coordination service was described to the patient and immediate needs were discussed. Patient is aware [facility] team includes CHW, SW, RN, and FRW. Patient agreed enrollment into CCC service.     CHW explained initial assessment, CCC monthly outreach follow up and CCC outreach standard work. CHW completed the CHW screening and Care Mgmt (GEN) above with patient. CHW assisted with scheduling patient initial assessment appt with [facility] .     Pt is informed that most needs are for her son Joey; CHW suggested pt to seek further advised with [facility] SW about pt enrollment into [facility]  "service. Pt confirmed. CHW contact info given to the patient.     Patient shared:   -My son Joey is now 20 years old. Joey falling out window when he was 1 year old from window about \"7-8 feet.\" I was cooking at the time when this happened. Was told that Joey's functionality/thinking is around kids ages 3-4.   -Joey is currently receiving pca service; get 4 hours daily and SSI benefit.   -Needs help apply legal guardianship and legal of assist my son's financial (FYI: CHW explained representative payee. Pt confirmed).   -Joey live with me,  and family.   -I bring my son Joey to his medical appt.   -I drive and attend my own medical appt.   -I am doing well. Have no resources need for myself. No other questions or concerns.     Note/comment:   -Patient shared pt's son full name and : \"Joey Onofre; : 10/19/2002.\"    Plan:  Patient attend initial assessment on 2023 at 10:00AM with [facility]  via phone visit.                  "

## 2023-04-12 NOTE — PROGRESS NOTES
I called [facility] and one of their admin will call pt to schedule pt for reestablish care. Will check back tomorrow.

## 2023-04-13 NOTE — PROGRESS NOTES
[facility] have not call pt yet for scheduling. I told pt to wait for them to see if they'll call her today and I'll check back with her tomorrow.

## 2023-04-14 ENCOUNTER — PATIENT OUTREACH (OUTPATIENT)
Dept: NURSING | Facility: CLINIC | Age: 58
End: 2023-04-14
Payer: COMMERCIAL

## 2023-04-14 ASSESSMENT — ACTIVITIES OF DAILY LIVING (ADL): DEPENDENT_IADLS:: INDEPENDENT

## 2023-04-14 NOTE — PROGRESS NOTES
[facility] have not contacted pt yet, I told pt to wait for a call as they may be behind on scheduling. I also did tell pt to call us back if she hear nothing from them in a week. Completing task.

## 2023-04-14 NOTE — PROGRESS NOTES
Clinic Care Coordination Contact    Clinic Care Coordination Contact  OUTREACH    Referral Information:  Referral Source: PCP    CC SW called and connected with pt this morning via  line. SW introduced herself to pt and explained why she was calling. SW and pt discussed the concerns she had regarding her adult son Joey (20) and explained some of his past history and why she was wanting to pursue legal guardianship. SW asked about current services that her son was receiving and she confirmed he was receiving PCA hours but no CADI waiver services. SW and pt discussed how to get her son assessed for CADI which would open up case management.     SW and pt completed the CCC assessment. SW clarified questions pt had about [facility] services as she was under the impression SW was calling from a different referral.     Chief Complaint   Patient presents with     Clinic Care Coordination - Initial        Universal Utilization:   Clinic Utilization  Difficulty keeping appointments:: No  Compliance Concerns: No  No-Show Concerns: No  No PCP office visit in Past Year: No  Utilization    Hospital Admissions  0             ED Visits  0             No Show Count (past year)  6                Current as of: 4/14/2023  3:16 AM              Clinical Concerns:  Current Medical Concerns:  Graves Disease    Current Behavioral Concerns: Major Depressive Disorder    Education Provided to patient: [facility], CADI services   Pain  Pain (GOAL):: No  Health Maintenance Reviewed: Due/Overdue   Never done  YEARLY PREVENTIVE VISIT  Never done  ADVANCE CARE PLANNING  Never done  COLORECTAL CANCER SCREENING  Never done  PAP  Never done  DTAP/TDAP/TD IMMUNIZATION (1 - Tdap)  Never done  ZOSTER IMMUNIZATION (1 of 2)      10/10/2023  PHQ-9  08/29/2024  MAMMO SCREENING      08/29/2027  LIPID    Clinical Pathway: None    Medication Management:  Medication review status: Not reviewed       Functional Status:  Dependent ADLs:: Independent  Dependent  IADLs:: Independent  Bed or wheelchair confined:: No  Mobility Status: Independent  Fallen 2 or more times in the past year?: No  Any fall with injury in the past year?: No    Living Situation:  Current living arrangement:: I live in a private home with family  Type of residence:: Private home - stairs    Lifestyle & Psychosocial Needs:    Social Determinants of Health     Tobacco Use: Low Risk  (4/10/2023)    Patient History      Smoking Tobacco Use: Never      Smokeless Tobacco Use: Never      Passive Exposure: Not on file   Alcohol Use: Not on file   Financial Resource Strain: Not on file   Food Insecurity: Not on file   Transportation Needs: Not on file   Physical Activity: Not on file   Stress: Not on file   Social Connections: Not on file   Intimate Partner Violence: Not on file   Depression: At risk (4/10/2023)    PHQ-2      PHQ-2 Score: 6   Housing Stability: Not on file     Diet:: Regular  Inadequate nutrition (GOAL):: No  Tube Feeding: No  Inadequate activity/exercise (GOAL):: No  Significant changes in sleep pattern (GOAL): No  Transportation means:: Family     Hinduism or spiritual beliefs that impact treatment:: No  Mental health DX:: No  Mental health management concern (GOAL):: No  Chemical Dependency Status: No Current Concerns  Informal Support system:: Family (Pt shared; I drive to medical appt. I bring my son Joey to his medical appt.)        Resources and Interventions:  Current Resources:      Community Resources: None  Supplies Currently Used at Home: None  Equipment Currently Used at Home: none            Advance Care Plan/Directive  Advanced Care Plans/Directives on file:: No  Advanced Care Plan/Directive Status: Considering Options    Referrals Placed: None       Care Plan:  Care Plan: Legal Guardianship     Problem: HP GENERAL PROBLEM     Goal: I want assistance for applying for legal guardianship of my adult son     Start Date: 4/14/2023 Expected End Date: 10/16/2023    Priority: Medium     Note:     Barriers: Language barrier  Strengths: Family support,   Patient expressed understanding of goal: Yes  Action steps to achieve this goal:  1. I will talk with AMERICO BANG on 4/28 @ 10am to connect with the [facility] to get my son on the reassessment waitlist for CADI services for case management  2. I will also discuss the legal guardianship alternative [facility] options available as [facility] 's are at capacity for clients.   3. I will connect with [facility] team monthly to discuss goal progression and any other needs that may arise                        Patient/Caregiver understanding: Yes    Outreach Frequency: monthly  Future Appointments              In 2 weeks SPRS CCC MERRITT [facility]    In 3 months Nava Tran MD [facility]          Plan: AMERICO BANG to connect in 2 weeks to contact [facility] and guardianship assistance    MAHIN Moncada   Social Work Care Coordinator   [facility]    [phone]

## 2023-04-28 ENCOUNTER — PATIENT OUTREACH (OUTPATIENT)
Dept: NURSING | Facility: CLINIC | Age: 58
End: 2023-04-28
Payer: COMMERCIAL

## 2023-04-28 NOTE — PROGRESS NOTES
Clinic Care Coordination Contact    Follow Up Progress Note      Assessment: CC MERRITT connected with pt this morning regarding getting son signed up for CADI assessment    Care Gaps:    Health Maintenance Due   Topic Date Due     YEARLY PREVENTIVE VISIT  Never done     ADVANCE CARE PLANNING  Never done     COLORECTAL CANCER SCREENING  Never done     PAP  Never done     DTAP/TDAP/TD IMMUNIZATION (1 - Tdap) Never done     ZOSTER IMMUNIZATION (1 of 2) Never done       Postponed to a later date. Contacted regarding assisting pt connect with [facility] for her son     Care Plans  Care Plan: Legal Guardianship     Problem: HP GENERAL PROBLEM     Goal: I want assistance for applying for legal guardianship of my adult son     Start Date: 4/14/2023 Expected End Date: 10/16/2023    Priority: Medium    Note:     Barriers: Language barrier  Strengths: Family support,   Patient expressed understanding of goal: Yes  Action steps to achieve this goal:  1. I will talk with AMERICO BANG on 4/28 @ 10am to connect with the [facility] to get my son on the reassessment waitlist for CADI services for case management  2. I will also discuss the legal guardianship alternative [facility] options available as [facility] 's are at capacity for clients.   3. I will connect with CCC team monthly to discuss goal progression and any other needs that may arise                      CC MERRITT called and spoke with pt this afternoon via  line. SW and pt discussed talking to the [facility] to get her son reassessed for CADI services. SW dialed out and connect pt with [facility] .  and pt discussed CADI services and completed a form to start the reassessment. Pt's son is now signed up to have a CADI waiver assessment completed.    Intervention/Education provided during outreach: CADI services,     Outreach Frequency: monthly    Plan: AMERICO BANG to mail pt information about guardianship in preferred language so that she can read more about the  process and what is needed     Care Coordinator will follow up in 4 weeks per standard pt outreach    MAHIN Moncada   Social Work Care Coordinator   [facility]    [phone]

## 2023-05-12 ENCOUNTER — PATIENT OUTREACH (OUTPATIENT)
Dept: CARE COORDINATION | Facility: CLINIC | Age: 58
End: 2023-05-12
Payer: COMMERCIAL

## 2023-05-12 NOTE — PROGRESS NOTES
Clinic Care Coordination Contact  Community Health Worker Follow Up    Reason: CCC CHW Follow Up Called (follow up current goals)    Care Gaps:   Health Maintenance Due   Topic Date Due     YEARLY PREVENTIVE VISIT  Never done     ADVANCE CARE PLANNING  Never done     COLORECTAL CANCER SCREENING  Never done     PAP  Never done     DTAP/TDAP/TD IMMUNIZATION (1 - Tdap) Never done     ZOSTER IMMUNIZATION (1 of 2) Never done     Patient agreed to discuss care gaps details with Dr. Tran in clinic on 7/13/2023. Pt already have a preventative visit appt scheduled. CHW noted pt agreed to the pt appt.      Care Plan:   Care Plan: Legal Guardianship     Problem: HP GENERAL PROBLEM     Goal: I want assistance for applying for legal guardianship of my adult son     Start Date: 4/14/2023 Expected End Date: 10/16/2023    This Visit's Progress: 50%    Priority: Medium    Note:     Barriers: Language barrier  Strengths: Family support,   Patient expressed understanding of goal: Yes    Action steps to achieve this goal:  1. I will talk with [facility] on 4/28 @ 10am to connect with the county to get my son on the reassessment waitlist for CADI services for case management. (Completed; 5/12/2023)  2. I will also discuss the legal guardianship alternative [facility] options available as [facility] 's are at capacity for clients. (Updated: 5/12/2023)-continues  3. I will connect with [facility] team monthly to discuss goal progression and any other needs that may arise. (Updated: 5/12/2023)-continues                      Care Plan: General     Problem: HP GENERAL PROBLEM     Goal: General Goal - I would like to completed the yearly physical visit and consulted due care gaps details with PCP/PCP provider by the next 1-2 months.     Start Date: 5/12/2023    This Visit's Progress: 10%    Note:     Goal: I would like to completed the yearly physical visit and consulted due care gaps details with PCP/PCP provider by the next 1-2 months.     Measure of  "Success: NA  Barriers: Language  Strengths: Attend preventative visit appt with PCP in clinic.   Patient expressed understanding of goal: yes    Action steps to achieve this goal:  1. I will attend appt 7/13/2023 with Dr. Tran in [facility].   2. I will seek further advised with Dr. Tran regards to due care gaps.   3. I will updates status with [facility] team at next outreach follow up.                         Patient chart reviewed:  -Encounter dated 4/28/2023; per [facility] SW notes reviewed; \"CC SW connected with pt this morning regarding getting son signed up for CADI.\"    Patient Outreach Discussion:   [facility] CHW was able to connect with patient today. Patient shared info below. Updated and added goals above. CHW reminded pt to future preventative visit appt 7/13/2023 with Dr. Tran in [facility]. Encouraged pt to discuss due care gaps with Dr. Tran. Pt confirmed.     Patient shared:  -Completed visit 4/28/2023 with SW. Hearing no updates yet.   -Doing well. No other questions or concerns.     CHW suggested patient for the following:  -Pt connect CCC/CHW with any additional resources need and PCP office for scheduling appt.    Appt reminder:   Date: 7/13/2023 Status: Scheduled     Time: 2:20 PM Length: 40     Visit Type: PREVENTATIVE ADULT [2429] Copay: $0.00     Provider: Nava Tran MD Department: [facility] FAMILY MEDICINE/OB     Encounter #: [id number] Notes: PHY     CHW Next Follow-up: Goals follow up. Reminded pt to preventative visit appt 7/13/2023 with Dr. Tran in [facility].    Outreach frequency: monthly      CHW Plan:   -Patient attend appt: 7/13/2023 with Dr. Tran in [facility].   -Next CHW outreach plan: 1 month.    "

## 2023-05-30 ENCOUNTER — PATIENT OUTREACH (OUTPATIENT)
Dept: CARE COORDINATION | Facility: CLINIC | Age: 58
End: 2023-05-30
Payer: COMMERCIAL

## 2023-05-30 NOTE — PROGRESS NOTES
Care Coordination Clinician Chart Review    Situation: Patient chart reviewed by Care Coordinator.       Background: Care Coordination Program started: 4/10/2023. Initial assessment completed and patient-centered care plan(s) were developed with participation from patient. Lead CC handed patient off to CHW for continued outreaches.       Assessment: Per chart review, patient outreach completed by CC CHW on 5/12/23.  Patient is actively working to accomplish goal(s). Patient's goal(s) appropriate and relevant at this time. Patient is not due for updated Plan of Care.  Assessments will be completed annually or as needed/with change of patient status.      Care Plan: Legal Guardianship     Problem: HP GENERAL PROBLEM     Goal: I want assistance for applying for legal guardianship of my adult son     Start Date: 4/14/2023 Expected End Date: 10/16/2023    This Visit's Progress: 50%    Priority: Medium    Note:     Barriers: Language barrier  Strengths: Family support,   Patient expressed understanding of goal: Yes    Action steps to achieve this goal:  1. I will talk with CC  on 4/28 @ 10am to connect with the county to get my son on the reassessment waitlist for CADI services for case management. (Completed; 5/12/2023)  2. I will also discuss the legal guardianship alternative [facility] options available as [facility] 's are at capacity for clients. (Updated: 5/12/2023)-continues  3. I will connect with CCC team monthly to discuss goal progression and any other needs that may arise. (Updated: 5/12/2023)-continues                      Care Plan: General     Problem: HP GENERAL PROBLEM     Goal: General Goal - I would like to completed the yearly physical visit and consulted due care gaps details with PCP/PCP provider by the next 1-2 months.     Start Date: 5/12/2023    This Visit's Progress: 10%    Note:     Goal: I would like to completed the yearly physical visit and consulted due care gaps details with PCP/PCP  provider by the next 1-2 months.     Measure of Success: NA  Barriers: Language  Strengths: Attend preventative visit appt with PCP in clinic.   Patient expressed understanding of goal: yes    Action steps to achieve this goal:  1. I will attend appt 7/13/2023 with Dr. Tran [facility].   2. I will seek further advised with Dr. Tran regards to due care gaps.   3. I will updates status with CCC team at next outreach follow up.                              Plan/Recommendations: The patient will continue working with Care Coordination to achieve goal(s) as above. CHW will continue outreaches at minimum every 30 days and will involve Lead CC as needed or if patient is ready to move to Maintenance. Lead CC will continue to monitor CHW outreaches and patient's progress to goal(s) every 6 weeks.     Plan of Care updated and sent to patient: MAHIN Hollis   Social Work Care Coordinator   [facility]    [phone]

## 2023-06-27 ENCOUNTER — PATIENT OUTREACH (OUTPATIENT)
Dept: CARE COORDINATION | Facility: CLINIC | Age: 58
End: 2023-06-27
Payer: COMMERCIAL

## 2023-06-27 NOTE — PROGRESS NOTES
Clinic Care Coordination Contact:  Community Health Worker Follow Up    Care Gaps:   Health Maintenance Due   Topic Date Due     YEARLY PREVENTIVE VISIT  Never done     ADVANCE CARE PLANNING  Never done     COLORECTAL CANCER SCREENING  Never done     PAP  Never done     DTAP/TDAP/TD IMMUNIZATION (1 - Tdap) Never done     ZOSTER IMMUNIZATION (1 of 2) Never done     Pt agreed to discuss/review due care gaps with PCP in clinic on 7/13/2023.    Care Plan:   Care Plan: Legal Guardianship     Problem: HP GENERAL PROBLEM     Goal: I want assistance for applying for legal guardianship of my adult son     Start Date: 4/14/2023 Expected End Date: 10/16/2023    This Visit's Progress: 50% Recent Progress: 50%    Priority: Medium    Note:     Barriers: Language barrier  Strengths: Family support,   Patient expressed understanding of goal: Yes    Action steps to achieve this goal:  1. I will talk with [facility] on 4/28 @ 10am to connect with the county to get my son on the reassessment waitlist for CADI services for case management. (Completed; 5/12/2023)  2. I will also discuss the [facility]. (Updated: 5/12/2023)-continues  3. I will connect with CCC team monthly to discuss goal progression and any other needs that may arise. (Updated: 5/12/2023)-continues    (Updated: 6/27/2023)- not able to follow up goal due to pt time.                       Care Plan: General     Problem: HP GENERAL PROBLEM     Goal: General Goal - I would like to completed the yearly physical visit and consulted due care gaps details with PCP/PCP provider by the next 1-2 months.     Start Date: 5/12/2023    This Visit's Progress: 20% Recent Progress: 10%    Note:     Goal: I would like to completed the yearly physical visit and consulted due care gaps details with PCP/PCP provider by the next 1-2 months.     Measure of Success: NA  Barriers: Language  Strengths: Attend  "preventative visit appt with PCP in clinic.   Patient expressed understanding of goal: yes    Action steps to achieve this goal:  1. I will attend appt 7/13/2023 with Dr. Tran in [facility]. (Updated: 6/27/2023)  2. I will seek further advised with Dr. Tran regards to due care gaps. (Updated: 6/27/2023)  3. I will updates status with CCC team at next outreach follow up. (Updated: 6/27/2023)                        Patient Outreach Discussion:  CHW was able to connect with patient today regards to reason above. Check in how pt is doing. Updated goal above due to pt time.      Patient shared:   -Home service assessment for my \"Joey\" completed on mid of May 2023 by a male representative from the [facility]. Completed paperwork. Unclear with service discussed and it's not PCA service per what I was aware. Waiting to hear from the [facility] for the next step.   -I'm doing well. No there questions.    Appt reminder:   Name: Norberto Onofre MRN: [id number]     Date: 7/13/2023 Status: Scheduled     Time: 2:20 PM Length: 40     Visit Type: PREVENTATIVE ADULT [2429] Copay: $0.00     Provider: Nava Tran MD Department: [facility]       Notes: PHY; discuss care gaps details per pt agreed 5/12     CHW suggested patient for the following:  -Pt connect CCC/CHW with any additional resources need and PCP office for scheduling appt.    CHW Next Follow-up: Goals follow up    Outreach frequency: monthly      CHW Plan:   -Patient attend appt: 7/13/2023 with Dr. Tran  -Next CHW outreach plan: 1 month    "

## 2023-06-28 NOTE — PROGRESS NOTES
Clinic Care Coordination Contact:    Reason: CHW consult/verified with [facility] MERRITT/lead care coordinator regards to home service or program patient mention below    Coordinate care:   Note/comment: Pt shared to CHW during conversation below that she disliked the home assessment representative with in-person  asking the same/repeated questions. The home assessment didn't go well. Pt unable to identify service assessed.     CHW consulted with [facility] MERRITT hCarles/lead care coordinator on 6/27/2023 at [facility] case review meeting regards to reason, note/comment above, and name of child apply for.   [facility] MERRITT confirmed CADI waiver for pt's on Joey.     CHW patient outreach discussion and patient shared below with [facility] MERRITT.     [facility] SW plan to follow up on this with patient or the county sometime next week per SW advised. CHW acknowledged.    Plan:   -[facility] SW follow up with pt or the county sometime next week per SW advised.   -Next CHW outreach plan: 1 month.

## 2023-07-13 ENCOUNTER — OFFICE VISIT (OUTPATIENT)
Dept: FAMILY MEDICINE | Facility: CLINIC | Age: 58
End: 2023-07-13
Attending: FAMILY MEDICINE
Payer: COMMERCIAL

## 2023-07-13 VITALS
RESPIRATION RATE: 16 BRPM | DIASTOLIC BLOOD PRESSURE: 89 MMHG | TEMPERATURE: 97.9 F | HEART RATE: 59 BPM | BODY MASS INDEX: 23.95 KG/M2 | WEIGHT: 122 LBS | OXYGEN SATURATION: 98 % | SYSTOLIC BLOOD PRESSURE: 165 MMHG | HEIGHT: 60 IN

## 2023-07-13 DIAGNOSIS — R73.03 PREDIABETES: ICD-10-CM

## 2023-07-13 DIAGNOSIS — E05.90 HYPERTHYROIDISM: ICD-10-CM

## 2023-07-13 DIAGNOSIS — E55.9 VITAMIN D DEFICIENCY: ICD-10-CM

## 2023-07-13 DIAGNOSIS — R92.8 ABNORMAL MAMMOGRAM: ICD-10-CM

## 2023-07-13 DIAGNOSIS — Z23 ENCOUNTER FOR IMMUNIZATION: ICD-10-CM

## 2023-07-13 DIAGNOSIS — R03.0 ELEVATED BP WITHOUT DIAGNOSIS OF HYPERTENSION: ICD-10-CM

## 2023-07-13 DIAGNOSIS — Z00.00 ROUTINE GENERAL MEDICAL EXAMINATION AT A HEALTH CARE FACILITY: Primary | ICD-10-CM

## 2023-07-13 DIAGNOSIS — J30.9 ALLERGIC RHINITIS, UNSPECIFIED SEASONALITY, UNSPECIFIED TRIGGER: ICD-10-CM

## 2023-07-13 DIAGNOSIS — Z12.11 SCREEN FOR COLON CANCER: ICD-10-CM

## 2023-07-13 DIAGNOSIS — Z12.4 CERVICAL CANCER SCREENING: ICD-10-CM

## 2023-07-13 DIAGNOSIS — D69.6 THROMBOCYTOPENIA (H): ICD-10-CM

## 2023-07-13 DIAGNOSIS — F32.1 CURRENT MODERATE EPISODE OF MAJOR DEPRESSIVE DISORDER WITHOUT PRIOR EPISODE (H): ICD-10-CM

## 2023-07-13 LAB
ERYTHROCYTE [DISTWIDTH] IN BLOOD BY AUTOMATED COUNT: 12.4 % (ref 10–15)
HBA1C MFR BLD: 5.6 % (ref 0–5.6)
HCT VFR BLD AUTO: 40.4 % (ref 35–47)
HGB BLD-MCNC: 13.4 G/DL (ref 11.7–15.7)
MCH RBC QN AUTO: 29.6 PG (ref 26.5–33)
MCHC RBC AUTO-ENTMCNC: 33.2 G/DL (ref 31.5–36.5)
MCV RBC AUTO: 89 FL (ref 78–100)
PLATELET # BLD AUTO: 115 10E3/UL (ref 150–450)
RBC # BLD AUTO: 4.52 10E6/UL (ref 3.8–5.2)
T4 FREE SERPL-MCNC: 0.76 NG/DL (ref 0.9–1.7)
TSH SERPL DL<=0.005 MIU/L-ACNC: 6.05 UIU/ML (ref 0.3–4.2)
WBC # BLD AUTO: 5.1 10E3/UL (ref 4–11)

## 2023-07-13 PROCEDURE — 99214 OFFICE O/P EST MOD 30 MIN: CPT | Mod: 25 | Performed by: FAMILY MEDICINE

## 2023-07-13 PROCEDURE — 84443 ASSAY THYROID STIM HORMONE: CPT | Performed by: FAMILY MEDICINE

## 2023-07-13 PROCEDURE — 84439 ASSAY OF FREE THYROXINE: CPT | Performed by: FAMILY MEDICINE

## 2023-07-13 PROCEDURE — 36415 COLL VENOUS BLD VENIPUNCTURE: CPT | Performed by: FAMILY MEDICINE

## 2023-07-13 PROCEDURE — 87624 HPV HI-RISK TYP POOLED RSLT: CPT | Performed by: FAMILY MEDICINE

## 2023-07-13 PROCEDURE — 83036 HEMOGLOBIN GLYCOSYLATED A1C: CPT | Performed by: FAMILY MEDICINE

## 2023-07-13 PROCEDURE — 85027 COMPLETE CBC AUTOMATED: CPT | Performed by: FAMILY MEDICINE

## 2023-07-13 PROCEDURE — 99396 PREV VISIT EST AGE 40-64: CPT | Performed by: FAMILY MEDICINE

## 2023-07-13 PROCEDURE — G0145 SCR C/V CYTO,THINLAYER,RESCR: HCPCS | Performed by: FAMILY MEDICINE

## 2023-07-13 RX ORDER — CETIRIZINE HYDROCHLORIDE 10 MG/1
10 TABLET ORAL DAILY PRN
Qty: 90 TABLET | Refills: 4 | Status: SHIPPED | OUTPATIENT
Start: 2023-07-13 | End: 2023-08-15

## 2023-07-13 RX ORDER — MIRTAZAPINE 30 MG/1
30 TABLET, FILM COATED ORAL AT BEDTIME
Qty: 90 TABLET | Refills: 1 | Status: SHIPPED | OUTPATIENT
Start: 2023-07-13 | End: 2024-08-12

## 2023-07-13 RX ORDER — ACETAMINOPHEN 160 MG
2000 TABLET,DISINTEGRATING ORAL DAILY
Qty: 90 CAPSULE | Refills: 4 | Status: SHIPPED | OUTPATIENT
Start: 2023-07-13 | End: 2023-08-15

## 2023-07-13 RX ORDER — FLUOXETINE 40 MG/1
40 CAPSULE ORAL DAILY
Qty: 90 CAPSULE | Refills: 4 | Status: SHIPPED | OUTPATIENT
Start: 2023-07-13 | End: 2023-08-15

## 2023-07-13 ASSESSMENT — ENCOUNTER SYMPTOMS
CONSTIPATION: 0
CHILLS: 0
DIARRHEA: 0
ARTHRALGIAS: 0
EYE PAIN: 0
HEARTBURN: 0
PALPITATIONS: 0
PARESTHESIAS: 0
FREQUENCY: 0
FEVER: 0
WEAKNESS: 0
NERVOUS/ANXIOUS: 0
HEMATOCHEZIA: 0
SORE THROAT: 0
SHORTNESS OF BREATH: 0
MYALGIAS: 0
BREAST MASS: 0
HEADACHES: 0
DYSURIA: 0
DIZZINESS: 0
HEMATURIA: 0
ABDOMINAL PAIN: 0
JOINT SWELLING: 0
NAUSEA: 0
COUGH: 0

## 2023-07-13 ASSESSMENT — PATIENT HEALTH QUESTIONNAIRE - PHQ9
SUM OF ALL RESPONSES TO PHQ QUESTIONS 1-9: 8
10. IF YOU CHECKED OFF ANY PROBLEMS, HOW DIFFICULT HAVE THESE PROBLEMS MADE IT FOR YOU TO DO YOUR WORK, TAKE CARE OF THINGS AT HOME, OR GET ALONG WITH OTHER PEOPLE: NOT DIFFICULT AT ALL
SUM OF ALL RESPONSES TO PHQ QUESTIONS 1-9: 8

## 2023-07-13 NOTE — Clinical Note
Pt needs help with education on how to do cologard - how to collect and submit.  She has cologard at home but doesn't know what to do with this.
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
## 2023-07-13 NOTE — Clinical Note
See 4/10/23 encounter- pt still hasn't established care with Natalis yet.  Please help pt with this.

## 2023-07-17 ENCOUNTER — TELEPHONE (OUTPATIENT)
Dept: FAMILY MEDICINE | Facility: CLINIC | Age: 58
End: 2023-07-17
Payer: COMMERCIAL

## 2023-07-17 ENCOUNTER — APPOINTMENT (OUTPATIENT)
Dept: INTERPRETER SERVICES | Facility: CLINIC | Age: 58
End: 2023-07-17
Payer: COMMERCIAL

## 2023-07-17 NOTE — TELEPHONE ENCOUNTER
Called patient to assist the step by step on collecting and returning sample to Cologuard.  Step by step information retrieve from Cologuard website and shared with patient.  Patient verbalized understood instruction. Advised to call the clinic with any questions.     REESE ManceraN, RN  [facility]      JARON FLANAGAN [facility] Nurse Pool  From: Nava Tran MD   Sent: 7/13/2023   3:28 PM CDT   To: [facility] Care Guide Pool     Pt needs help with education on how to do cologard - how to collect and submit.  She has cologard at home but doesn't know what to do with this.

## 2023-07-18 ENCOUNTER — TELEPHONE (OUTPATIENT)
Dept: FAMILY MEDICINE | Facility: CLINIC | Age: 58
End: 2023-07-18
Payer: COMMERCIAL

## 2023-07-18 ENCOUNTER — APPOINTMENT (OUTPATIENT)
Dept: INTERPRETER SERVICES | Facility: CLINIC | Age: 58
End: 2023-07-18
Payer: COMMERCIAL

## 2023-07-18 LAB
BKR LAB AP GYN ADEQUACY: NORMAL
BKR LAB AP GYN INTERPRETATION: NORMAL
BKR LAB AP HPV REFLEX: NORMAL
BKR LAB AP PREVIOUS ABNORMAL: NORMAL
PATH REPORT.COMMENTS IMP SPEC: NORMAL
PATH REPORT.COMMENTS IMP SPEC: NORMAL
PATH REPORT.RELEVANT HX SPEC: NORMAL

## 2023-07-18 RX ORDER — METHIMAZOLE 5 MG/1
7.5 TABLET ORAL DAILY
Qty: 135 TABLET | Refills: 0 | Status: SHIPPED | OUTPATIENT
Start: 2023-07-18 | End: 2023-08-17

## 2023-07-18 NOTE — RESULT ENCOUNTER NOTE
Team - please call patient with results and send result letter with this information if patient desires for their records.     Her thyroid is now over treated- she is taking 10mg daily.   I want her to decrease her dose to 7.5mg daily- this will be 1 1/2 of the new pills I sent in today.   We can check her blood next time she comes to see me    No anemia  Her platelets are a little low- we will recheck again at her next visit    No diabetes

## 2023-07-18 NOTE — TELEPHONE ENCOUNTER
Called patient with [facility]  Jude. Relayed provider message, patient verbalized understanding and denied questions.

## 2023-07-20 LAB
HUMAN PAPILLOMA VIRUS 16 DNA: NEGATIVE
HUMAN PAPILLOMA VIRUS 18 DNA: NEGATIVE
HUMAN PAPILLOMA VIRUS FINAL DIAGNOSIS: NORMAL
HUMAN PAPILLOMA VIRUS OTHER HR: NEGATIVE

## 2023-07-28 ENCOUNTER — PATIENT OUTREACH (OUTPATIENT)
Dept: CARE COORDINATION | Facility: CLINIC | Age: 58
End: 2023-07-28
Payer: COMMERCIAL

## 2023-07-28 NOTE — PROGRESS NOTES
"Clinic Care Coordination Contact:    Message received from Dr. Tran on 7/13/2023 via cc'd charts (in-basket) message communication:  \"From: Nava Tran MD   Sent: 7/13/2023   3:28 PM CDT   To: [facility] Care Guide [place]     Pt needs help with education on how to do cologard - how to collect and submit.  She has cologard at home but doesn't know what to do with this.\"       Coordinate Care:   Sheridan Toribio MA P [facility] Care Team [place]; Sheridan Toribio MA; Araceli Verduzco LSW; Fay Rivas, RN  \"[facility] clinical team,   [facility] RN is currently off at this time.   Please have a clinic RN or clinical team to connect with patient regards to message received from Dr. Tran below- \"education on how to do cologard.\"   Thanks.   Cc'd: self, CCC RN and [facility] SW as fyi.\"    Message received from CCC RN on 7/26/2023 via \"cc'd charts\" in-basket message communicate (regards to pt office visit encounter dated 7/13/2023 with Dr. Tran):   \"Fay Rivas RN Brown, Tiara K, LSW; Sheridan Toribio MA  Upon your next scheduled outreach if pt has kit and would like RN CC outreach to support understanding please schedule a call with me   Thanks.\"    Patient chart reviewed;   Per RN Chas telephone encounter dated 7/17/2023 notes reviewed;   \"Called patient to assist the step by step on collecting and returning sample to Cologuard.  Step by step information retrieve from Cologuard website and shared with patient.  Patient verbalized understood instruction. Advised to call the clinic with any questions.\"    Plan:   CHW connect with patient today, 7/28/2023.    "

## 2023-07-28 NOTE — PROGRESS NOTES
Clinic Care Coordination Contact:  Community Health Worker Follow Up    Reason:   -CCC CHW Follow Up Called (follow up current goal's)  -Message received from [facility] RN Fay (below)    Care Gaps:   Health Maintenance Due   Topic Date Due     COLORECTAL CANCER SCREENING  Never done     DTAP/TDAP/TD IMMUNIZATION (1 - Tdap) Never done     ZOSTER IMMUNIZATION (1 of 2) Never done     Unable to informed pt of due care gaps at this time. Patient is in a rush expecting a incoming call from the [facility] per pt. Defer to next [facility] outreach follow up.    Care Plan:   Care Plan: Legal Guardianship     Problem: HP GENERAL PROBLEM     Goal: I want assistance for applying for legal guardianship of my adult son     Start Date: 4/14/2023 Expected End Date: 10/16/2023    This Visit's Progress: 50% Recent Progress: 50%    Priority: Medium    Note:     Barriers: Language barrier  Strengths: Family support,   Patient expressed understanding of goal: Yes    Action steps to achieve this goal:  1. I will talk with [facility] on 4/28 @ 10am to connect with the [facility] to get my son on the reassessment waitlist for CADI services for case management. (Completed; 5/12/2023)  2. I will also discuss the legal guardianship alternative SMRLS options available as SMRLS 's are at capacity for clients. (Updated: 5/12/2023)-continues  3. I will connect with [facility] team monthly to discuss goal progression and any other needs that may arise. (Updated: 5/12/2023)-continues    (Updated: 7/28/2023)- Unable to follow up goal with pt. Pt is expecting a incoming call from the [facility] per pt. (CHW MX)                      Care Plan: General     Problem: HP GENERAL PROBLEM     Goal: General Goal - I would like to completed the yearly physical visit and consulted due care gaps details with PCP/PCP provider by the next 1-2 months.     Start Date: 5/12/2023    This Visit's Progress: 20% Recent Progress: 20%    Note:     Goal: I would like to completed the yearly physical  visit and consulted due care gaps details with PCP/PCP provider by the next 1-2 months.     Measure of Success: NA  Barriers: Language  Strengths: Attend preventative visit appt with PCP in clinic.   Patient expressed understanding of goal: yes    Action steps to achieve this goal:  1. I will attend appt 7/13/2023 with Dr. Tran in [facility]. (Updated: 6/27/2023)  2. I will seek further advised with Dr. Tran regards to due care gaps. (Updated: 6/27/2023)  3. I will updates status with CCC team at next outreach follow up. (Updated: 6/27/2023)    (Updated: 7/28/2023)- Unable to follow up goal with pt. Pt is expecting a incoming call from the [facility] per pt. (CHW MX)                      Care Plan: General     Problem: HP GENERAL PROBLEM     Goal: General Goal - I would like another education in regards to completing the cologuard kit step-by-step by the next 1 month.     Start Date: 7/28/2023    This Visit's Progress: 10%    Note:     Measure of Success: NA  Barriers: language  Strengths: Work with CCC RN   Patient expressed understanding of goal: yes    Action steps to achieve this goal:  1. I will attend appt on 7/31/2023 at 10:00AM with CCC RN Fay for further assistance.   2. I will follow CCC RN educations/instructions.                           Patient Outreach Discussion:   [facility] CHW was able to connect with patient today regards to reason above. Pt confirmed that she forgot the step by step that provides by CARON Doherty on 7/17/2023 and request to speak with a RN again. Patient set as a new goal. CHW supported with scheduling pt appt with CCC RN on 7/31/2023 at 10:00AM via phone visit to seek further education on completing the Cologuard kit/sample.    Pt shared that she is doing well and expecting a incoming call from the [facility]. Pt is informed to connect with CHW anytime before next CHW outreach for any additional resources need. Pt confirmed.      CHW suggested patient for the following:  -Pt connect CCC/CHW with any  additional resources need and PCP office for scheduling appt.  -Pt follow CCC RN instructions/recommendation.     CHW Next Follow-up: Goals follow up. Informed patient of due care gaps (if any).     Outreach frequency: monthly      CHW Plan:   -Patient attend appt: 7/31/2023 at 10:00AM with CCC RN via phone visit.   -Next CHW outreach plan: 1 month

## 2023-07-31 ENCOUNTER — PATIENT OUTREACH (OUTPATIENT)
Dept: NURSING | Facility: CLINIC | Age: 58
End: 2023-07-31
Payer: COMMERCIAL

## 2023-07-31 NOTE — PROGRESS NOTES
Clinic Care Coordination Contact  [facility]/Voicemail       Clinical Data: Care Coordinator Outreach  Outreach attempted x 1.  Left message on patient's voicemail with call back information and requested return call.  Chart review notes   Patient attended Annual wellness exam in July   Patient would like support with colo guard collection  Pt does have Mammogram scheduled for later this week    A referral was placed for MTM to support medication management - visit scheduled for 8/15/23 with MTM at [place]    PCP also notes that patient would like to work with Esha again to support mental health disability - will note to Lead CC to support and consider goal       Future Appointments   Date Time Provider Department Center   7/31/2023 10:00 AM [facility]   [facility]   8/3/2023 11:00 AM [facility]   8/15/2023  9:00 AM [facility]   10/13/2023  2:40 PM Nava Tran MD [facility]     Plan: Community Health Worker to outreach per standard work and updated on goal progression  Lead will follow up and if RN CC support will set up visit in future

## 2023-08-01 ENCOUNTER — PATIENT OUTREACH (OUTPATIENT)
Dept: CARE COORDINATION | Facility: CLINIC | Age: 58
End: 2023-08-01
Payer: COMMERCIAL

## 2023-08-01 NOTE — PROGRESS NOTES
Clinic Care Coordination Contact  Care Coordination Clinician Chart Review    Situation: Patient chart reviewed by Care Coordination leadership related to casual SW staff assignment planning. 6 week chart review completed to facilitate ongoing plan of care.       Background: Care Coordination Program started: 4/10/2023. Initial assessment completed and patient-centered care plan(s) were developed with participation from patient. Lead CC handed patient off to CHW for continued outreaches.       Assessment: Per chart review, patient outreach completed by CC CHW on 7/2/23 and additional RN CC outreach on 7/31/23.  Patient is actively working to accomplish goal(s). Patient's goal(s) appropriate and relevant at this time. Patient is due for updated Plan of Care.  Assessments will be completed annually or as needed/with change of patient status.      Care Plan: Legal Guardianship       Problem: HP GENERAL PROBLEM       Goal: I want assistance for applying for legal guardianship of my adult son       Start Date: 4/14/2023 Expected End Date: 10/16/2023    This Visit's Progress: 50% Recent Progress: 50%    Priority: Medium    Note:     Barriers: Language barrier  Strengths: Family support,   Patient expressed understanding of goal: Yes    Action steps to achieve this goal:  1. I will talk with CC SW on 4/28 @ 10am to connect with the [facility] to get my son on the reassessment waitlist for CADI services for case management. (Completed; 5/12/2023)  2. I will also discuss the legal guardianship alternative SMRLS options available as SMRLS 's are at capacity for clients. (Updated: 5/12/2023)-continues  3. I will connect with CCC team monthly to discuss goal progression and any other needs that may arise. (Updated: 5/12/2023)-continues    (Updated: 7/28/2023)- Unable to follow up goal with pt. Pt is expecting a incoming call from the [facility] per pt. (CHW MX)                              Care Plan: General       Problem: HP  GENERAL PROBLEM       Goal: General Goal - I would like another education in regards to completing the cologuard kit step-by-step by the next 1 month.       Start Date: 7/28/2023    This Visit's Progress: 10%    Note:     Measure of Success: NA  Barriers: language  Strengths: Work with CCC RN   Patient expressed understanding of goal: yes    Action steps to achieve this goal:  1. I will attend appt on 7/31/2023 at 10:00AM with CCC RN Fay for further assistance.   2. I will follow CCC RN educations/instructions.                                      Plan/Recommendations: The patient will continue working with Care Coordination to achieve goal(s) as above. CHW will continue outreaches at minimum every 30 days and will involve Lead CC as needed or if patient is ready to move to Maintenance. Lead CC will continue to monitor CHW outreaches and patient's progress to goal(s) every 6 weeks.     Plan of Care updated and sent to patient: Yes, via [employer]

## 2023-08-01 NOTE — LETTER
[facility]  Patient Centered Plan of Care  About Me:        Patient Name:  Norberto Onofre    YOB: 1965  Age:         58 year old   Vianey MRN:    [id number] Telephone Information:  Home Phone [phone]   Mobile [phone]   Home Phone [phone]       Address:  [address]  [place] [address] Email address:  [email or web address]      Emergency Contact(s)    Name Relationship Lgl Grd Work Phone Home Phone Mobile Phone   ARNULFO MCCLELLAND Other   [phone]            Primary language:  Hmong     needed? Yes   [employer]:  [phone] op. 1  Other communication barriers:Language barrier    Preferred Method of Communication:     Current living arrangement: I live in a private home with family    Mobility Status/ Medical Equipment: Independent        Health Maintenance  Health Maintenance Reviewed: Due/Overdue   Health Maintenance Due   Topic Date Due    COLORECTAL CANCER SCREENING  Never done    DTAP/TDAP/TD IMMUNIZATION (1 - Tdap) Never done    ZOSTER IMMUNIZATION (1 of 2) Never done           My Access Plan  Medical Emergency 911   Primary Clinic Line [facility] [phone]   24 Hour Appointment Line [phone] or  [phone]) (toll-free)   24 Hour Nurse Line [phone] (toll-free)   Preferred Urgent Care [facility], [phone]     Preferred Hospital [facility]  [phone]     Preferred Pharmacy [facility] - [place], [place] - [facility] [address]     Behavioral Health Crisis Line The National Suicide Prevention Lifeline at 1-483.791.8539 or Text/Call 218             My Care Team Members  Patient Care Team         Relationship Specialty Notifications Start End    Nava Tran MD PCP - General Family Medicine  8/29/22     Phone: [phone] Fax: [phone]         [address] [place] [id number]    Nava Tran MD Assigned PCP   8/3/22     Phone:  [phone] Fax: [phone]         [address] [facility] [address]    Araceli Verduzco, LSW Lead Care Coordinator  Admissions 4/12/23     Sheridan Toribio MA Community Health Worker  Admissions 4/14/23     Fay Rivas RN Clinic Care Coordinator Primary Care - CC Admissions 7/31/23 8/1/23              My Care Plans  Self Management and Treatment Plan  Care Plan  Care Plan: Legal Guardianship       Problem: HP GENERAL PROBLEM       Goal: I want assistance for applying for legal guardianship of my adult son       Start Date: 4/14/2023 Expected End Date: 10/16/2023    This Visit's Progress: 50% Recent Progress: 50%    Priority: Medium    Note:     Barriers: Language barrier  Strengths: Family support,   Patient expressed understanding of goal: Yes    Action steps to achieve this goal:  1. I will talk with CC SW on 4/28 @ 10am to connect with the [facility] to get my son on the reassessment waitlist for CADI services for case management. (Completed; 5/12/2023)  2. I will also discuss the legal guardianship alternative SMRLS options available as SMRLS 's are at capacity for clients. (Updated: 5/12/2023)-continues  3. I will connect with CCC team monthly to discuss goal progression and any other needs that may arise. (Updated: 5/12/2023)-continues    (Updated: 7/28/2023)- Unable to follow up goal with pt. Pt is expecting a incoming call from the [facility] per pt. (CHW MX)                                 Action Plans on File:            Depression          Advance Care Plans/Directives Type:   No data recorded    My Medical and Care Information  Problem List   Patient Active Problem List   Diagnosis    Current moderate episode of major depressive disorder without prior episode (H)    Graves disease    Hyperthyroidism    Insomnia, idiopathic    Vitamin D deficiency    Thrombocytopenia (H)    Caregiver burden    Abnormal mammogram    Prediabetes    Elevated BP without diagnosis of hypertension      Current Medications and  Allergies:  See printed Medication Report.    Care Coordination Start Date: 4/10/2023   Frequency of Care Coordination: monthly     Form Last Updated: 08/01/2023

## 2023-08-14 NOTE — PROGRESS NOTES
Medication Therapy Management (MTM) Encounter    ASSESSMENT:                            Medication Adherence/Access: No issues identified      Depression:  PHQ9 score not goal <5, and actually worsened since last visit despite report that symptoms have improved. Patient states she's thinking differently about her depression and does feel overall it has gotten better. Reviewed that mirtazapine is meant to help with depression symptoms in addition to sleep so should be used daily. With body aches and tingling pain, instead of increasing fluoxetine, will add low dose Duloxetine, with long-term plan to transition fully to Duloxetine.     Financial stressors are a continued trigger for depression. Will connect with CHW team for financial resources.        Low Vitamin D: Last levels low normal, below target 45-60 for depression. Hasn't been receiving supplement. Will restart today. Plan for recheck after 12 weeks of therapy (mid November).        Hyperthyroidism: Last TSH elevated. Appropriately reduced methimazole dose. Will recheck levels today.       Allergic rhinitis:  Unclear if patient has been using Cetirizine. Has only been using Flonase infrequently - recommend daily use. Will also send lubricating eye drops.        Elevated blood pressure: blood pressure elevated x 2 today. Was elevated at last PCP visit. Pulse just below goal . States she was upset about wait times for both visits and that's why blood pressure increase so hesitant to start a medicine today. Will continue to work on treating depression and recheck at next visit.       PLAN:                            Continue Fluoxetine 40 mg daily and start Duloxetine 30 mg daily x 2 weeks then 2 capsules at bedtime.   Restart Vitamin D3 2000 units daily   Refill cetirizine and Flonase. Use Flonase 2 sprays each nostril daily.   Start artifical tear drops both eyes as needed.   Repeat TSH         Follow-up: Return in about 4 weeks (around 9/12/2023) for  "Medication Management Pharmacist, in person.    SUBJECTIVE/OBJECTIVE:                          Norberto Onofre is a 58 year old female coming in for an initial visit. She was referred to me from Nava Tran MD. [facility]  in person (ID# May).         Reason for visit: Referred for depression follow-up. Patient unsure of reason for follow-up.     Allergies/ADRs: Reviewed in chart  Past Medical History: Reviewed in chart  Tobacco: She reports that she has never smoked. She has never used smokeless tobacco.  Alcohol:  reports no history of alcohol use.       Medication Adherence/Access:     Forgot med bottles at home.   Pharmacy writes directions in [facility]       Depression: At last PCP visit, fluoxetine increased to 40 mg daily, mirtazapine increased to 30 mg daily    Using Mirtazapine as a \"sleep medicine.\" If she has a busy day and feels tired, will skip the dose.     The increase helped with reducing depression and over-thinking. Also helped with sleep.   Still has depression symptoms - \"I'm a mom so I will always have depression with my kids.\"   Single mom. Doesn't have relatives here. Half of the kids are adult. Continued stress with finances- cost of living increases, bills increasing. Worried about their welfare - that they're not getting along well.     Has not discussed financial concerns with CHW recently.Did previously discuss but the food program was too far and patient wasn't able to access the resources.     Denies SI because she loves her children too much.     Also having trouble with generalized body aches and pains. Describes worsened pains with changes of seasons. Muscles in the neck and shoulder get tighter. Pain in the low back. Pain in the knees, wrist, ankles. Lots of tingling in the right hand. Difficulty with brushing teeth or hair.         4/10/2023    12:36 PM 7/13/2023     1:26 PM 8/15/2023     9:40 AM   PHQ   PHQ-9 Total Score 19 8 17   Q9: Thoughts of better off " dead/self-harm past 2 weeks Not at all Not at all Not at all         Low Vitamin D: Prescribed Vitamin D3 2000 units daily .     States the pharmacy hasn't been giving this to her. Has been out for 3-4 months.     Vitamin D Deficiency Screening Results:  Lab Results   Component Value Date    VITDT 38 08/29/2022        Hyperthyroidism: Prescribed methimazole 5 mg 1.5 tabs daily (dose decreased after last PCP visit). Reports correct dose of 1.5 tabs.        TSH   Date Value Ref Range Status   07/13/2023 6.05 (H) 0.30 - 4.20 uIU/mL Final   02/09/2023 1.61 0.30 - 4.20 uIU/mL Final   08/29/2022 <0.01 (L) 0.30 - 4.20 uIU/mL Corrected     Comment:     The original report was corrected to reflect that the true result is less than the previously resulted value.   This is a corrected result. Previous result was 0.01 uIU/mL on 8/29/2022 at 10:28 PM CDT         Allergic rhinitis: Prescribed cetirizine 10 mg daily, Flonase 50 mcg 1 spray left nostril twice daily    Red eyes- irritated. Requesting something for allergies.   States nasal spray isn't very helpful (last filled in April)   Cetirizine filled last 7/13 but patient is unsure if she has at home.       Elevated blood pressure: Not currently prescribed medication.     States blood pressure increases when she's irritated.   Was upset today that she had a long wait before being called back for her visit. Had to wait last time as well.     BP Readings from Last 3 Encounters:   08/15/23 (!) 154/88   07/13/23 (!) 165/89   04/10/23 137/86      Pulse Readings from Last 3 Encounters:   08/15/23 52   07/13/23 59   04/10/23 68     Wt Readings from Last 3 Encounters:   07/13/23 122 lb (55.3 kg)   04/10/23 125 lb 4 oz (56.8 kg)   02/09/23 130 lb (59 kg)                Today's Vitals: BP (!) 154/88   Pulse 52          ----------------      I spent 45 minutes with this patient today. All changes were made via collaborative practice agreement with Nava Tran MD. A copy of the visit  note was provided to the patient's provider(s).    A summary of these recommendations was given to the patient.    Tj He, PharmD  Medication Therapy Management (MTM) Pharmacist  [facility]          Medication Therapy Recommendations  Chronic rhinitis    Current Medication: fluticasone (FLONASE) 50 MCG/ACT nasal spray (Discontinued)   Rationale: Medication product not available - Adherence - Adherence   Recommendation: Provide Adherence Intervention   Status: Accepted per CPA         Current moderate episode of major depressive disorder without prior episode (H)    Current Medication: FLUoxetine (PROZAC) 40 MG capsule (Discontinued)   Rationale: Synergistic therapy - Needs additional medication therapy - Indication   Recommendation: Start Medication - DULoxetine 30 MG capsule   Status: Accepted per CPA         Vitamin D deficiency    Current Medication: Cholecalciferol (VITAMIN D3) 50 MCG (2000 UT) CAPS (Discontinued)   Rationale: Medication product not available - Adherence - Adherence   Recommendation: Provide Adherence Intervention   Status: Accepted per CPA

## 2023-08-15 ENCOUNTER — LAB (OUTPATIENT)
Dept: LAB | Facility: CLINIC | Age: 58
End: 2023-08-15
Payer: COMMERCIAL

## 2023-08-15 ENCOUNTER — OFFICE VISIT (OUTPATIENT)
Dept: PHARMACY | Facility: CLINIC | Age: 58
End: 2023-08-15
Attending: FAMILY MEDICINE
Payer: COMMERCIAL

## 2023-08-15 VITALS — SYSTOLIC BLOOD PRESSURE: 154 MMHG | DIASTOLIC BLOOD PRESSURE: 88 MMHG | HEART RATE: 52 BPM

## 2023-08-15 DIAGNOSIS — R03.0 ELEVATED BP WITHOUT DIAGNOSIS OF HYPERTENSION: ICD-10-CM

## 2023-08-15 DIAGNOSIS — E55.9 VITAMIN D DEFICIENCY: ICD-10-CM

## 2023-08-15 DIAGNOSIS — E05.90 HYPERTHYROIDISM: ICD-10-CM

## 2023-08-15 DIAGNOSIS — F32.1 CURRENT MODERATE EPISODE OF MAJOR DEPRESSIVE DISORDER WITHOUT PRIOR EPISODE (H): Primary | ICD-10-CM

## 2023-08-15 DIAGNOSIS — J30.9 ALLERGIC RHINITIS, UNSPECIFIED SEASONALITY, UNSPECIFIED TRIGGER: ICD-10-CM

## 2023-08-15 DIAGNOSIS — J31.0 CHRONIC RHINITIS: ICD-10-CM

## 2023-08-15 DIAGNOSIS — H60.502 ACUTE OTITIS EXTERNA OF LEFT EAR, UNSPECIFIED TYPE: ICD-10-CM

## 2023-08-15 LAB
T4 FREE SERPL-MCNC: 0.67 NG/DL (ref 0.9–1.7)
TSH SERPL DL<=0.005 MIU/L-ACNC: 9.92 UIU/ML (ref 0.3–4.2)

## 2023-08-15 PROCEDURE — 84443 ASSAY THYROID STIM HORMONE: CPT

## 2023-08-15 PROCEDURE — 36415 COLL VENOUS BLD VENIPUNCTURE: CPT

## 2023-08-15 PROCEDURE — 99607 MTMS BY PHARM ADDL 15 MIN: CPT | Performed by: PHARMACIST

## 2023-08-15 PROCEDURE — 84439 ASSAY OF FREE THYROXINE: CPT

## 2023-08-15 PROCEDURE — 99605 MTMS BY PHARM NP 15 MIN: CPT | Performed by: PHARMACIST

## 2023-08-15 RX ORDER — ACETAMINOPHEN 160 MG
2000 TABLET,DISINTEGRATING ORAL DAILY
Qty: 90 CAPSULE | Refills: 4 | Status: SHIPPED | OUTPATIENT
Start: 2023-08-15 | End: 2024-08-12

## 2023-08-15 RX ORDER — FLUOXETINE 40 MG/1
40 CAPSULE ORAL DAILY
Qty: 30 CAPSULE | Refills: 1 | Status: SHIPPED | OUTPATIENT
Start: 2023-08-15 | End: 2024-08-12 | Stop reason: ALTCHOICE

## 2023-08-15 RX ORDER — CETIRIZINE HYDROCHLORIDE 10 MG/1
10 TABLET ORAL DAILY PRN
Qty: 90 TABLET | Refills: 4 | Status: SHIPPED | OUTPATIENT
Start: 2023-08-15 | End: 2024-08-12

## 2023-08-15 RX ORDER — DULOXETIN HYDROCHLORIDE 30 MG/1
CAPSULE, DELAYED RELEASE ORAL
Qty: 60 CAPSULE | Refills: 1 | Status: SHIPPED | OUTPATIENT
Start: 2023-08-15 | End: 2024-03-01

## 2023-08-15 RX ORDER — FLUTICASONE PROPIONATE 50 MCG
2 SPRAY, SUSPENSION (ML) NASAL DAILY
Qty: 16 G | Refills: 0 | Status: SHIPPED | OUTPATIENT
Start: 2023-08-15 | End: 2024-08-12

## 2023-08-15 RX ORDER — POLYVINYL ALCOHOL 14 MG/ML
1 SOLUTION/ DROPS OPHTHALMIC PRN
Qty: 15 ML | Refills: 3 | Status: SHIPPED | OUTPATIENT
Start: 2023-08-15 | End: 2024-08-12

## 2023-08-15 ASSESSMENT — PATIENT HEALTH QUESTIONNAIRE - PHQ9: SUM OF ALL RESPONSES TO PHQ QUESTIONS 1-9: 17

## 2023-08-15 NOTE — Clinical Note
Patient reports financial difficulties - bills, foods etc that are contributing to depression concerns. Previously referred to a food program, but wasn't able to access it because it was too far. Can we review eligibility for other assistance programs?  Thanks!

## 2023-08-15 NOTE — PATIENT INSTRUCTIONS
"Recommendations from today's MTM visit:                                                    MTM (medication therapy management) is a service provided by a clinical pharmacist designed to help you get the most of out of your medicines.   Today we reviewed what your medicines are for, how to know if they are working, that your medicines are safe and how to make your medicine regimen as easy as possible.      Continue Fluoxetine 40 mg daily and start Duloxetine 30 mg daily x 2 weeks then 2 capsules at bedtime.   Restart Vitamin D3 2000 units daily   Refill cetirizine and Flonase. Use Flonase 2 sprays each nostril daily.   Start artifical tear drops both eyes as needed.   Repeat TSH     Follow-up: Return in about 4 weeks (around 9/12/2023) for Medication Management Pharmacist, in person.    It was great speaking with you today.  I value your experience and would be very thankful for your time in providing feedback in our clinic survey. In the next few days, you may receive an email or text message from [employer] with a link to a survey related to your  clinical pharmacist.\"     To schedule another MTM appointment, please call the clinic directly or you may call the MTM scheduling line at [phone] or toll-free at [phone].     My Clinical Pharmacist's contact information:                                                      Please feel free to contact me with any questions or concerns you have.      Tj He, PharmD  Medication Therapy Management (MTM) Pharmacist  [facility]      "

## 2023-08-16 ENCOUNTER — ANCILLARY PROCEDURE (OUTPATIENT)
Dept: MAMMOGRAPHY | Facility: CLINIC | Age: 58
End: 2023-08-16
Attending: FAMILY MEDICINE
Payer: COMMERCIAL

## 2023-08-16 ENCOUNTER — PATIENT OUTREACH (OUTPATIENT)
Dept: CARE COORDINATION | Facility: CLINIC | Age: 58
End: 2023-08-16
Payer: COMMERCIAL

## 2023-08-16 DIAGNOSIS — R92.8 ABNORMAL MAMMOGRAM: ICD-10-CM

## 2023-08-16 PROCEDURE — 77066 DX MAMMO INCL CAD BI: CPT

## 2023-08-16 NOTE — PROGRESS NOTES
"Clinic Care Coordination Contact    Received message below from PharmNITZA on 8/15/2023;   \"Tj He PharmD Brown, Tiara K, LSW; Sheridan Toribio, MA  Patient reports financial difficulties - bills, foods etc that are contributing to depression concerns. Previously referred to a food program, but wasn't able to access it because it was too far. Can we review eligibility for other assistance programs?    Thanks!\"    CHW Plan:   Connect with patient on 8/16/2023 regards to message above and CHW follow up.   "

## 2023-08-16 NOTE — PROGRESS NOTES
Clinic Care Coordination Contact:  Community Health Worker Follow Up    Reason:   -CCC CHW Follow Up Called (follow up current goal's)  -Connect with patient regards to message received from PharmD below    Care Gaps:   Health Maintenance Due   Topic Date Due    COLORECTAL CANCER SCREENING  Never done    DTAP/TDAP/TD IMMUNIZATION (1 - Tdap) Never done    ZOSTER IMMUNIZATION (1 of 2) Never done     Unable to informed pt of due care gaps due to pt time. Defer to next CHW/CCC outreach follow up.    Care Plan:   Care Plan: Legal Guardianship       Problem: HP GENERAL PROBLEM       Goal: I want assistance for applying for legal guardianship of my adult son       Start Date: 4/14/2023 Expected End Date: 10/16/2023    This Visit's Progress: 50% Recent Progress: 50%    Priority: Medium    Note:     Barriers: Language barrier  Strengths: Family support,   Patient expressed understanding of goal: Yes    Action steps to achieve this goal:  1. I will talk with  SW on 4/28 @ 10am to connect with the county to get my son on the reassessment waitlist for CADI services for case management. (Completed; 5/12/2023)  2. I will also discuss the legal guardianship alternative SMRLS options available as SMRLS 's are at capacity for clients. (Updated: 5/12/2023)-continues  3. I will connect with [facility] team monthly to discuss goal progression and any other needs that may arise. (Updated: 8/16/2023)-continues  4. I will attend appt on 8/23/2023 with CCC SW via phone visit to discuss further assistance regards to legal guardianship. (Updated: 8/16/2023)                          Problem: HP GENERAL PROBLEM       Goal: General Goal - I would like to be connected to food shelf agencies that is closer to my home address/location by the next 1-2 month.       Start Date: 8/16/2023    Note:     Measure of Success: NA  Barriers: Language; prefer closer to home food shelf agency  Strengths: Review food shelf agencies send by CCC team, and  "connect with agency to schedule grocery shopping appt.    Patient expressed understanding of goal: yes    Action steps to achieve this goal:  1. I will wait to receive the food shelf resources info send by [facility] MERRITT via mail.   2. I will review food shelf agencies and call to schedule grocery shopping appt.   3. I will updates [facility] service at next month outreach follow up.     Note/FYI:   -8/16/2023: [facility] MERRITT send food shelf resources to pt via mail per CHW coordinated. (CHW MX)                              Patient outreach discussion:   CHW was able to connect with patient today regards to reason above. Verified whether or not pt is active with SNAP and cash assistance through the [facility]. Verified bills, concerns, and offer food shelf agencies.     Informed pt of energy assistance program will be re-open in the fall around October-November 2023; encouraged pt to apply if interested.     Pt is aware of \"Benef[place]: [place] on October 2023- SNAP benefits are increasing starting October 1 due to the cost of living adjustment (COLA). Maximum allotments are increasing by 3.5%, and everyone will receive some increase. This increase is automatic. Learn more from the MyLikes.\"     Pt agreed with [facility] send additional food shelf resources to her via mail and resources encouraged above. Pt add a new goal. Updated current goal. Pt appt schedule on 8/23/2023 at 10:00AM with [facility] MERRITT Charles via phone per pt request.      Patient shared:   -Received food shelf resources from [facility] in the past but prefer closer agencies.   -Family is currently active with food stamp over $300 and cash assistance $460.00 per month. Benefits is not enough to pay bills.   -Vadim Trujillo still in school and cash benefit is for him.   -Vadim Cook approved 3 hours per week of \"homemaker\" program. Completed interview with [facility] on Monday, 8/14/2023. Assigned daughter Dianne Suh as the homemaker aid.   -Joey is active with 4 " hours of PCA service per day. Daughter Dianne Suh is the pca person.   -Legal guardianship update: no updates. Hearing nothing from the county. Would like to speak with SW about this.   -Completed my appt with [facility] today.   -Doing well. No other questions.     CHW suggested patient for the following:  -Pt connect CCC/CHW with any additional resources need and PCP office for scheduling appt.    Note/comment:   -CHW coordinated with SW via Teams today. SW agreed to send additional food shelf resources to patient per pt agreed.     CHW Next Follow-up: Goals follow up. Informed patient of due care gaps.    Outreach frequency: monthly      CHW Plan:   -Patient attend appt: 8/23/2023 at 10:00AM with CCC SW via phone visit to discuss legal guardianship goal progression. Pt seeking further advised.   -Next CHW outreach plan: 1 month.

## 2023-08-17 ENCOUNTER — TELEPHONE (OUTPATIENT)
Dept: FAMILY MEDICINE | Facility: CLINIC | Age: 58
End: 2023-08-17
Payer: COMMERCIAL

## 2023-08-17 ENCOUNTER — APPOINTMENT (OUTPATIENT)
Dept: INTERPRETER SERVICES | Facility: CLINIC | Age: 58
End: 2023-08-17
Payer: COMMERCIAL

## 2023-08-17 DIAGNOSIS — E05.90 HYPERTHYROIDISM: ICD-10-CM

## 2023-08-17 RX ORDER — METHIMAZOLE 5 MG/1
5 TABLET ORAL DAILY
Qty: 90 TABLET | Refills: 0 | Status: SHIPPED | OUTPATIENT
Start: 2023-08-17 | End: 2024-03-01

## 2023-08-17 NOTE — TELEPHONE ENCOUNTER
----- Message from Tj He PharmD sent at 8/16/2023  3:12 PM CDT -----  TSH increased despite dose reduction. Did not have meds at last MTM visit - will hold off on additional changes at this time.     Team - please contact patient and ensure she is using Methimazole 5 mg tablets, and taking 1.5 tabs daily. (The new supply was sent in July).

## 2023-08-18 ENCOUNTER — APPOINTMENT (OUTPATIENT)
Dept: INTERPRETER SERVICES | Facility: CLINIC | Age: 58
End: 2023-08-18
Payer: COMMERCIAL

## 2023-08-18 NOTE — TELEPHONE ENCOUNTER
Contacted patient using an  and relayed message from Dr Tran.  Patient already has an appointment scheduled on 10/13/23 with Dr Tran and labs can be done at appointment.  Patient will  new Rx and take as directed.  No further action needed.    Amaya Magallanes RN  [facility]    Dr Tran  Please have pt decrease to 5mg- 1 pill daily and recheck in 8 weeks

## 2023-08-23 ENCOUNTER — PATIENT OUTREACH (OUTPATIENT)
Dept: NURSING | Facility: CLINIC | Age: 58
End: 2023-08-23
Payer: COMMERCIAL

## 2023-08-23 NOTE — PROGRESS NOTES
Clinic Care Coordination Contact  Follow Up Progress Note     Assessment: Follow up with pt regarding CADI waiver services and guardianship    Care Gaps:    Health Maintenance Due   Topic Date Due    COLORECTAL CANCER SCREENING  Never done    DTAP/TDAP/TD IMMUNIZATION (1 - Tdap) Never done    ZOSTER IMMUNIZATION (1 of 2) Never done       Postponed to a later date due to nature of phone call     Care Plans  Care Plan: Legal Guardianship       Problem: HP GENERAL PROBLEM       Goal: I want assistance for applying for legal guardianship of my adult son       Start Date: 4/14/2023 Expected End Date: 10/16/2023    This Visit's Progress: 50% Recent Progress: 50%    Priority: Medium    Note:     Barriers: Language barrier  Strengths: Family support,   Patient expressed understanding of goal: Yes    Action steps to achieve this goal:  1. I will talk with  SW on 4/28 @ 10am to connect with the county to get my son on the reassessment waitlist for CADI services for case management. (Completed; 5/12/2023)  2. I will also discuss the legal [facility] available as [facility] are at capacity for clients. (Updated: 5/12/2023)-continues  3. I will connect with [facility] team monthly to discuss goal progression and any other needs that may arise. (Updated: 8/16/2023)-continues  4. I will attend appt on 8/23/2023 with [facility] SW via phone visit to discuss further assistance regards to legal guardianship. (Updated: 8/16/2023)                          Problem: HP GENERAL PROBLEM       Goal: General Goal - I would like to be connected to food shelf agencies that is closer to my home address/location by the next 1-2 month.       Start Date: 8/16/2023    Note:     Measure of Success: NA  Barriers: Language; prefer closer to home food shelf agency  Strengths: Review food shelf agencies send by [facility] team, and connect with agency to schedule grocery shopping appt.    Patient expressed understanding of goal:  yes    Action steps to achieve this goal:  1. I will wait to receive the food shelf resources info send by [facility] SW via mail.   2. I will review food shelf agencies and call to schedule grocery shopping appt.   3. I will updates [facility] service at next month outreach follow up.     Note/FYI:   -8/16/2023: [facility] SW send food shelf resources to pt via mail per CHW coordinated. (CHW MX)                              CC SW called and connected with pt this morning via  line. SW checked on how pt's day was going and discussed immediate needs. SW asked if pt's son had completed the CADI waiver assessment yet from [facility]. Pt could not confirm if he had or had not, so SW offered to call the [facility] and find out. SW spoke with a [facility] rep who confirmed that CADI services were established for pt and passed along pt son's  information. SW notified pt of the services that her son is receiving and explained that for guardianship the  may be of assistance. SW offered to connect with the  to find more information and pt agreed.    Intervention[facility]Education [facility]     Plan: CC MERRITT to connect with pt son's CADI  to discuss legal guardianship    Care Coordinator will follow up in 4 weeks per standard outreach    MAHIN Moncada   Social Work Care Coordinator   [facility]    [phone]

## 2023-09-12 ENCOUNTER — PATIENT OUTREACH (OUTPATIENT)
Dept: CARE COORDINATION | Facility: CLINIC | Age: 58
End: 2023-09-12
Payer: COMMERCIAL

## 2023-09-12 NOTE — PROGRESS NOTES
Care Coordination Clinician Chart Review    Situation: Patient chart reviewed by Care Coordinator.       Background: Care Coordination Program started: 4/10/2023. Initial assessment completed and patient-centered care plan(s) were developed with participation from patient. Lead CC handed patient off to CHW for continued outreaches.       Assessment: Per chart review, patient outreach completed by CC CHW on 8/23/23.  Patient is actively working to accomplish goal(s). Patient's goal(s) appropriate and relevant at this time. Patient is not due for updated Plan of Care.  Assessments will be completed annually or as needed/with change of patient status.      Care Plan: Legal Guardianship       Problem: HP GENERAL PROBLEM       Goal: I want assistance for applying for legal guardianship of my adult son       Start Date: 4/14/2023 Expected End Date: 10/16/2023    This Visit's Progress: 50% Recent Progress: 50%    Priority: Medium    Note:     Barriers: Language barrier  Strengths: Family support,   Patient expressed understanding of goal: Yes    Action steps to achieve this goal:  1. I will talk with  MERRITT on 4/28 @ 10am to connect with the [facility] to get my son on the reassessment waitlist for CADI services for case management. (Completed; 5/12/2023)  2. I will also discuss the legal guardianship alternative [facility]RLS options available as SMRLS 's are at capacity for clients. (Updated: 5/12/2023)-continues  3. I will connect with [facility] team monthly to discuss goal progression and any other needs that may arise. (Updated: 8/16/2023)-continues  4. I will attend appt on 8/23/2023 with [facility] SW via phone visit to discuss further assistance regards to legal guardianship. (Updated: 8/16/2023)                          Problem: HP GENERAL PROBLEM       Goal: General Goal - I would like to be connected to food [employer] agencies that is closer to my home address/location by the next 1-2 month.       Start Date: 8/16/2023    Note:      Measure of Success: NA  Barriers: Language; prefer closer to home food shelf agency  Strengths: Review food shelf agencies send by [facility] team, and connect with agency to schedule grocery shopping appt.    Patient expressed understanding of goal: yes    Action steps to achieve this goal:  1. I will wait to receive the food shelf resources info send by [facility] SW via mail.   2. I will review food shelf agencies and call to schedule grocery shopping appt.   3. I will updates [facility] service at next month outreach follow up.     Note/FYI:   -8/16/2023: [facility] SW send food shelf resources to pt via mail per CHW coordinated. (CHW MX)                                   Plan/Recommendations: The patient will continue working with Care Coordination to achieve goal(s) as above. CHW will continue outreaches at minimum every 30 days and will involve Lead CC as needed or if patient is ready to move to Maintenance. Lead CC will continue to monitor CHW outreaches and patient's progress to goal(s) every 6 weeks.     Plan of Care updated and sent to patient: MAHIN Hollis   Social Work Care Coordinator   [facility]    [phone]

## 2023-09-19 ENCOUNTER — PATIENT OUTREACH (OUTPATIENT)
Dept: CARE COORDINATION | Facility: CLINIC | Age: 58
End: 2023-09-19
Payer: COMMERCIAL

## 2023-09-19 NOTE — PROGRESS NOTES
Clinic Care Coordination Contact  Community Health Worker Follow Up    Reason: CCC CHW Follow Up Called (follow up current goal's)    Care Gaps:   Health Maintenance Due   Topic Date Due    COLORECTAL CANCER SCREENING  Never done    DTAP/TDAP/TD IMMUNIZATION (1 - Tdap) Never done    ZOSTER IMMUNIZATION (1 of 2) Never done    INFLUENZA VACCINE (1) 09/01/2023     Defer to next outreach follow up due to patient time. Pt is informed to connect with pcp office with appt scheduling questions and other concerns. Pt confirmed.    Care Plan:   Care Plan: Legal Guardianship       Problem: HP GENERAL PROBLEM       Goal: I want assistance for applying for legal guardianship of my adult son       Start Date: 4/14/2023 Expected End Date: 10/16/2023    This Visit's Progress: 60% Recent Progress: 50%    Priority: Medium    Note:     Barriers: Language barrier  Strengths: Family support,   Patient expressed understanding of goal: Yes    Action steps to achieve this goal:  1. I will talk with  SW on 4/28 @ 10am to connect with the county to get my son on the reassessment waitlist for CADI services for case management. (Completed; 5/12/2023)  2. I will also discuss the legal guardianship alternative [facility]RL[facility] options available as SMRLS 's are at capacity for clients. (Updated: 9/19/2023)-continues  3. I will connect with [facility] team monthly to discuss goal progression and any other needs that may arise. (Updated: 9/19/2023)-continues  4. I will attend appt on 8/23/2023 with [facility] SW via phone visit to discuss further assistance regards to legal guardianship. (Completed)  5. I will attend appt on 9/22/2023 with [facility] via phone visit to follow up status. (Updated: 9/19/2023)                          Problem: HP GENERAL PROBLEM       Goal: General Goal - I would like to be connected to food [employer] agencies that is closer to my home address/location by the next 1-2 month.  Completed 9/19/2023      Start Date: 8/16/2023    This Visit's  Progress: 100%    Note:     Measure of Success: NA  Barriers: Language; prefer closer to home food shelf agency  Strengths: Review food shelf agencies send by [facility] team, and connect with agency to schedule grocery shopping appt.    Patient expressed understanding of goal: yes    Action steps to achieve this goal:  1. I will wait to receive the food shelf resources info send by [facility] SW via mail. (Completed)  2. I will review food shelf agencies and call to schedule grocery shopping appt. (Completed)-active with SNAP. Will call or go to [employer] if additional food needs per pt on 9/19/2023.  3. I will updates [facility] service at next month outreach follow up. (Completed)  4. I will go to [employer] to  additional food to the table for my family if need. (Updated: 9/19/2023)    Note/FYI:   -8/16/2023: [facility] SW send food shelf resources to pt via mail per CHW coordinated. (CHW MX)    Note/comment:   Additional resources given & suggested to patient:   -9/19/2023: [facility] [employer]  Address: [address] [address]  Hours: Open ? Closes 12?PM ? Reopens 1:30?PM  Phone: [phone].    Personal Action Steps:   My family and I are currently active with $400.00 SNAP and $400.00 cash assistance through the [facility] besides household income. No questions of calling the assigned county worker. Adult child will assist calling the [facility] in the future with any questions. No questions or concerns regard to food need at this time. Met goal.                                 Care Plan: General Completed 7/31/2023      Problem: HP GENERAL PROBLEM  Resolved 7/31/2023      Goal: General Goal - I would like to completed the yearly physical visit and consulted due care gaps details with PCP/PCP provider by the next 1-2 months.  Completed 7/31/2023      Start Date: 5/12/2023    Recent Progress: 20%    Note:     Goal: I would like to completed the yearly physical visit and  "consulted due care gaps details with PCP/PCP provider by the next 1-2 months.     Measure of Success: NA  Barriers: Language  Strengths: Attend preventative visit appt with PCP in clinic.   Patient expressed understanding of goal: yes    Action steps to achieve this goal:  1. I will attend appt 7/13/2023 with Dr. Tran in [facility].   2. I will seek further advised with Dr. Tran regards to due care gaps.   3. I will updates status with CCC team at next outreach follow up.                                 Patient Outreach Discussion:  CHW was able to connect with patient today regard to reason above. Pt shared info below and request follow up appt with [facility] SW from last phone visit on 8/23/2023 per pt. Pt prefer appt schedule on 9/22/2023 to avoid appt conflict with the therapist next week. Pt appt schedule 9/22/2023 at 11:00AM with [facility] SW via phone visit per pt request. Updated current goal (above). Pt met and completed food goal.     CHW suggested pt to follow up SW and  instructions, connect PCP office with appt scheduling questions or concerns, and updates status with CHW/CCC at next month outreach follow up. Pt confirmed.     Patient shared:  -Currently follow up with therapist monthly for depression. Plan to attend appt on Monday, 9/25/2023.  -Minor child and family is currently active with SNAP $400.00, and cash benefit $400.00 monthly (Fyi: total of $800.00).   -Use SNAP benefit to buy groceries and food. Will go to the food shelf if need. No concerns with food at this time.   -Completed and submitted SNAP renewal paperwork to the [facility]. Waiting to hear from the [facility] for the next step.   -Hasn't hear from the [facility] and  from last visit with SW on 8/23/2023.   -Doing well. No other questions.     Per patient chart reviewed;   [facility] SW spoke with patient on 8/23/2023. Per SW notes reviewed; \"SW offered to connect with the  to find more information and pt agreed.  Plan: CC SW to connect " "with pt son's CADI  to discuss legal guardianship.\"    Future appt per appt desk reviewed;   Name: Norberto Onofre MRN: [id number]     Date: 10/13/2023 Status: Scheduled     Time: 2:40 PM Length: 20     Visit Type: OFFICE VISIT [6991] Copay: $0.00     Provider: Nava Tran MD Department: [facility] FAMILY MEDICINE/OB       Notes: 3 MONTHS F/U. Needs repeat thyroid labs     Appt schedule today per pt request:   Name: Norberto Onofre MRN: [id number]     Date: 9/22/2023 Status: Scheduled     Time: 11:00 AM Length: 60     Visit Type: CCC PHONE VISIT [9395] Copay: $0.00     Provider: [facility] CCC SW Department: [facility] NURSE       Notes: follow up from 8/23 conversation per pt request     CHW Next Follow-up: Goal follow up. Informed patient of due care gaps (if any).     Outreach frequency: monthly      CHW Plan:   -Patient attend appt: 9/22/2023 with BOB BANG via phone visit, and 10/13/2023 with Dr. Tran in [facility].  -Next CHW outreach plan: 1 month.      "

## 2023-09-22 ENCOUNTER — PATIENT OUTREACH (OUTPATIENT)
Dept: NURSING | Facility: CLINIC | Age: 58
End: 2023-09-22
Payer: COMMERCIAL

## 2023-09-29 NOTE — PROGRESS NOTES
Clinic Care Coordination Contact  Follow Up Progress Note      Assessment: CC SW called and connected with pt regarding guardianship for son    Care Gaps:    Health Maintenance Due   Topic Date Due    COLORECTAL CANCER SCREENING  Never done    DTAP/TDAP/TD IMMUNIZATION (1 - Tdap) Never done    ZOSTER IMMUNIZATION (1 of 2) Never done    INFLUENZA VACCINE (1) 09/01/2023    COVID-19 Vaccine (4 - 2023-24 season) 09/01/2023       Postponed to a later date due to nature of phone call     Care Plans  Care Plan: Legal Guardianship       Problem: HP GENERAL PROBLEM       Goal: I want assistance for applying for legal guardianship of my adult son       Start Date: 4/14/2023 Expected End Date: 10/16/2023    This Visit's Progress: 60% Recent Progress: 50%    Priority: Medium    Note:     Barriers: Language barrier  Strengths: Family support,   Patient expressed understanding of goal: Yes    Action steps to achieve this goal:  1. I will talk with CC SW on 4/28 @ 10am to connect with the county to get my son on the reassessment waitlist for CADI services for case management. (Completed; 5/12/2023)  2. I will also discuss the legal guardianship alternative [facility]RLS options available as SMRLS 's are at capacity for clients. (Updated: 9/19/2023)-continues  3. I will connect with [facility] team monthly to discuss goal progression and any other needs that may arise. (Updated: 9/19/2023)-continues  4. I will attend appt on 8/23/2023 with CCC SW via phone visit to discuss further assistance regards to legal guardianship. (Completed)  5. I will attend appt on 9/22/2023 with CCC SW via phone visit to follow up status. (Updated: 9/19/2023)                          Problem: HP GENERAL PROBLEM                   CC SW called and connected with pt this afternoon via  line. SW checked on how pt was doing and discussed immediate needs. SW checked if pt had heard from son's  as SW had tried to contact her a few times and  left messages for her to call pt to discuss guardianship. Pt reported that they haven't heard anything or met with the . SW noted that she would keep trying to connect with  and update pt if she heard anything.    Intervention/Education provided during outreach:      Outreach Frequency: monthly    Plan: CC MERRITT to continue to reach out to pt's  to discuss guardianship    Care Coordinator will follow up in 4 weeks per standard pt outreach    MAHIN Moncada   Social Work Care Coordinator   [facility]    [phone]

## 2023-10-19 ENCOUNTER — PATIENT OUTREACH (OUTPATIENT)
Dept: CARE COORDINATION | Facility: CLINIC | Age: 58
End: 2023-10-19
Payer: COMMERCIAL

## 2023-10-19 NOTE — PROGRESS NOTES
"Clinic Care Coordination Contact  [facility]/Voicemail    Reason: CCC CHW Follow Up Called (follow up current goal's)    Patient chart reviewed;   Per CCC SW encounter dated 9/22/2023 notes reviewed;   \"CC SW called and connected with pt regarding guardianship for son.  Plan: CC SW to continue to reach out to pt's  to discuss guardianship.\"     Clinical Data: Care Coordinator Outreach:  Outreach attempted x 1: Left message on patient's voicemail with call back information and requested return call.    Plan: Care Coordinator will try to reach patient again in 1-2 weeks.              "

## 2023-10-30 ENCOUNTER — PATIENT OUTREACH (OUTPATIENT)
Dept: CARE COORDINATION | Facility: CLINIC | Age: 58
End: 2023-10-30
Payer: COMMERCIAL

## 2023-10-30 NOTE — PROGRESS NOTES
Clinic Care Coordination Contact  Care Coordination Clinician Chart Review    Situation: Patient chart reviewed by Care Coordinator.       Background: Care Coordination Program started: 4/10/2023. Initial assessment completed and patient-centered care plan(s) were developed with participation from patient. Lead CC handed patient off to CHW for continued outreaches.       Assessment: Per chart review, patient outreach completed by CC CHW on 10/19/2023.  Patient is actively working to accomplish goal(s). Patient's goal(s) appropriate and relevant at this time. Patient is not due for updated Plan of Care.  Assessments will be completed annually or as needed/with change of patient status.      Care Plan: Legal Guardianship       Problem: HP GENERAL PROBLEM       Goal: I want assistance for applying for legal guardianship of my adult son       Start Date: 4/14/2023 Expected End Date: 10/16/2023    This Visit's Progress: 60% Recent Progress: 50%    Priority: Medium    Note:     Barriers: Language barrier  Strengths: Family support,   Patient expressed understanding of goal: Yes    Action steps to achieve this goal:  1. I will talk with  SW on 4/28 @ 10am to connect with the [facility] to get my son on the reassessment waitlist for CADI services for case management. (Completed; 5/12/2023)  2. I will also discuss the legal guardianship alternative [facility] options available as [facility] 's are at capacity for clients. (Updated: 9/19/2023)-continues  3. I will connect with [facility] team monthly to discuss goal progression and any other needs that may arise. (Updated: 9/19/2023)-continues  4. I will attend appt on 8/23/2023 with [facility] SW via phone visit to discuss further assistance regards to legal guardianship. (Completed)  5. I will attend appt on 9/22/2023 with [facility] SW via phone visit to follow up status. (Updated: 9/19/2023)                          Problem: HP GENERAL PROBLEM                        Plan/Recommendations: The  patient will continue working with Care Coordination to achieve goal(s) as above. CHW will continue outreaches at minimum every 30 days and will involve Lead CC as needed or if patient is ready to move to Maintenance. Lead CC will continue to monitor CHW outreaches and patient's progress to goal(s) every 6 weeks.     Plan of Care updated and sent to patient: No    MAHIN Moncada   Social Work Care Coordinator   [facility]    [phone]

## 2023-11-02 ENCOUNTER — PATIENT OUTREACH (OUTPATIENT)
Dept: CARE COORDINATION | Facility: CLINIC | Age: 58
End: 2023-11-02
Payer: COMMERCIAL

## 2023-11-02 NOTE — PROGRESS NOTES
"Clinic Care Coordination Contact  [facility]/Voicemail    Reason: CCC CHW Follow Up Called (follow up current goal's)     Patient chart reviewed;   -Per CCC SW encounter dated 9/22/2023 notes reviewed;   \"CC SW called and connected with pt regarding guardianship for son.  Plan: CC SW to continue to reach out to pt's  to discuss guardianship.\"  -Per SW notes reviewed; pt chart review completed by [facility] lead care coordinator/SW on 10/30/2023.    Clinical Data: Care Coordinator Outreach:    Outreach Documentation Number of Outreach Attempt   11/2/2023  12:30 PM 2     Attempted #2: CHW attempted to connect with patient today and no answer. Left message on patient's voicemail with call back information and requested return call.    CHW attempted to connect with patient at [phone] and it's a wrong phone number; phone number belongs to someone else.     Plan: Care Coordinator will send unable to contact letter with care coordinator contact information via [employer]. Care Coordinator will try to reach patient again in 1 month.          "

## 2023-11-02 NOTE — LETTER
[facility] CARE COORDINATION  [address] [address]     November 2, 2023    Norberto Onofre  [address]  [place] [address]      Dear Norbetro,    I have been attempting to reach you since our last contact. I would like to continue to work with you and provide any additional support you may need on achieving your health care related goals. I would appreciate if you would give me a call at [phone] to let me know if you would like to continue working together. I know that there are many things that can affect our ability to communicate and I hope we can continue to work together.    All of us at the [facility] are invested in your health and are here to assist you in meeting your goals. Thank you.         Sincerely,    Sheridan Toribio, MATT, CHW, CAB, CNA, [place]  Certified  [facility] Care Coordination  Office: [phone]  Clinic: [phone]

## 2023-12-11 ENCOUNTER — PATIENT OUTREACH (OUTPATIENT)
Dept: CARE COORDINATION | Facility: CLINIC | Age: 58
End: 2023-12-11
Payer: COMMERCIAL

## 2023-12-11 NOTE — Clinical Note
FELICIANO closed pt. Saw multiple UTC during a chart review and determined it was appropriate to close

## 2023-12-11 NOTE — PROGRESS NOTES
Clinic Care Coordination Contact    Situation: Patient chart reviewed by care coordinator.     Background: CC SW to assess if appropriate for CCC enrollment to close     Assessment: CC SW received notice from CHW for chart review to see if next steps are to close CCC or another outreach attempt should be made. SW determined it was appropriate for CCC to close.     Plan/Recommendations: Pt enrollment in CCC closed     MAHIN Moncada   Social Work Care Coordinator   [facility]    [phone]

## 2024-02-29 DIAGNOSIS — E05.90 HYPERTHYROIDISM: ICD-10-CM

## 2024-02-29 DIAGNOSIS — F32.1 CURRENT MODERATE EPISODE OF MAJOR DEPRESSIVE DISORDER WITHOUT PRIOR EPISODE (H): ICD-10-CM

## 2024-03-01 RX ORDER — METHIMAZOLE 5 MG/1
7.5 TABLET ORAL DAILY
Qty: 45 TABLET | Refills: 2 | Status: SHIPPED | OUTPATIENT
Start: 2024-03-01 | End: 2024-08-13

## 2024-03-01 RX ORDER — DULOXETIN HYDROCHLORIDE 60 MG/1
60 CAPSULE, DELAYED RELEASE ORAL DAILY
Qty: 30 CAPSULE | Refills: 3 | Status: SHIPPED | OUTPATIENT
Start: 2024-03-01 | End: 2024-08-12

## 2024-03-04 NOTE — TELEPHONE ENCOUNTER
"CALLED WITH  ON THE LINE.    LEFT DETAILS MESSAGE. PLZ HELP SCHEDULE F/U UPON RETURNING CALL    \"OKAY TO RELAY MESSAGE.\"        "

## 2024-06-13 ENCOUNTER — PATIENT OUTREACH (OUTPATIENT)
Dept: CARE COORDINATION | Facility: CLINIC | Age: 59
End: 2024-06-13
Payer: COMMERCIAL

## 2024-06-27 ENCOUNTER — PATIENT OUTREACH (OUTPATIENT)
Dept: CARE COORDINATION | Facility: CLINIC | Age: 59
End: 2024-06-27
Payer: COMMERCIAL

## 2024-08-12 ENCOUNTER — OFFICE VISIT (OUTPATIENT)
Dept: FAMILY MEDICINE | Facility: CLINIC | Age: 59
End: 2024-08-12
Payer: COMMERCIAL

## 2024-08-12 VITALS
WEIGHT: 126 LBS | SYSTOLIC BLOOD PRESSURE: 166 MMHG | DIASTOLIC BLOOD PRESSURE: 95 MMHG | RESPIRATION RATE: 20 BRPM | BODY MASS INDEX: 24.74 KG/M2 | TEMPERATURE: 97.7 F | HEIGHT: 60 IN | HEART RATE: 60 BPM | OXYGEN SATURATION: 98 %

## 2024-08-12 DIAGNOSIS — Z12.11 SCREEN FOR COLON CANCER: Primary | ICD-10-CM

## 2024-08-12 DIAGNOSIS — F32.1 CURRENT MODERATE EPISODE OF MAJOR DEPRESSIVE DISORDER WITHOUT PRIOR EPISODE (H): ICD-10-CM

## 2024-08-12 DIAGNOSIS — Z11.1 SCREENING EXAMINATION FOR PULMONARY TUBERCULOSIS: ICD-10-CM

## 2024-08-12 DIAGNOSIS — E55.9 VITAMIN D DEFICIENCY: ICD-10-CM

## 2024-08-12 DIAGNOSIS — D69.6 THROMBOCYTOPENIA (H): ICD-10-CM

## 2024-08-12 DIAGNOSIS — J30.9 ALLERGIC RHINITIS, UNSPECIFIED SEASONALITY, UNSPECIFIED TRIGGER: ICD-10-CM

## 2024-08-12 DIAGNOSIS — R73.03 PREDIABETES: ICD-10-CM

## 2024-08-12 DIAGNOSIS — I10 PRIMARY HYPERTENSION: ICD-10-CM

## 2024-08-12 DIAGNOSIS — Z12.31 ENCOUNTER FOR SCREENING MAMMOGRAM FOR MALIGNANT NEOPLASM OF BREAST: ICD-10-CM

## 2024-08-12 DIAGNOSIS — E05.00 GRAVES DISEASE: ICD-10-CM

## 2024-08-12 DIAGNOSIS — E05.90 HYPERTHYROIDISM: ICD-10-CM

## 2024-08-12 PROBLEM — H52.03 HYPEROPIA OF BOTH EYES: Status: ACTIVE | Noted: 2024-08-12

## 2024-08-12 PROBLEM — H40.013 OAG (OPEN ANGLE GLAUCOMA) SUSPECT, LOW RISK, BILATERAL: Status: ACTIVE | Noted: 2024-08-12

## 2024-08-12 PROBLEM — H02.831 DERMATOCHALASIS OF RIGHT UPPER EYELID: Status: ACTIVE | Noted: 2024-08-12

## 2024-08-12 PROBLEM — H35.89 RPE MOTTLING OF MACULA: Status: ACTIVE | Noted: 2024-08-12

## 2024-08-12 PROBLEM — H53.022 REFRACTIVE AMBLYOPIA, LEFT EYE: Status: ACTIVE | Noted: 2024-08-12

## 2024-08-12 PROBLEM — H25.13 NUCLEAR SCLEROTIC CATARACT OF BOTH EYES: Status: ACTIVE | Noted: 2024-08-12

## 2024-08-12 PROBLEM — H43.811 POSTERIOR VITREOUS DETACHMENT OF RIGHT EYE: Status: ACTIVE | Noted: 2024-08-12

## 2024-08-12 PROBLEM — R92.8 ABNORMAL MAMMOGRAM: Status: RESOLVED | Noted: 2023-07-13 | Resolved: 2024-08-12

## 2024-08-12 PROBLEM — H04.123 DRY EYE SYNDROME OF BOTH EYES: Status: ACTIVE | Noted: 2024-08-12

## 2024-08-12 LAB
ERYTHROCYTE [DISTWIDTH] IN BLOOD BY AUTOMATED COUNT: 12.2 % (ref 10–15)
HBA1C MFR BLD: 5.4 % (ref 0–5.6)
HCT VFR BLD AUTO: 41.5 % (ref 35–47)
HGB BLD-MCNC: 13.7 G/DL (ref 11.7–15.7)
MCH RBC QN AUTO: 29.1 PG (ref 26.5–33)
MCHC RBC AUTO-ENTMCNC: 33 G/DL (ref 31.5–36.5)
MCV RBC AUTO: 88 FL (ref 78–100)
PLATELET # BLD AUTO: 119 10E3/UL (ref 150–450)
RBC # BLD AUTO: 4.7 10E6/UL (ref 3.8–5.2)
WBC # BLD AUTO: 5.6 10E3/UL (ref 4–11)

## 2024-08-12 PROCEDURE — 83036 HEMOGLOBIN GLYCOSYLATED A1C: CPT | Performed by: FAMILY MEDICINE

## 2024-08-12 PROCEDURE — 84244 ASSAY OF RENIN: CPT | Mod: 90 | Performed by: FAMILY MEDICINE

## 2024-08-12 PROCEDURE — T1013 SIGN LANG/ORAL INTERPRETER: HCPCS | Mod: U3

## 2024-08-12 PROCEDURE — 84443 ASSAY THYROID STIM HORMONE: CPT | Performed by: FAMILY MEDICINE

## 2024-08-12 PROCEDURE — 85027 COMPLETE CBC AUTOMATED: CPT | Performed by: FAMILY MEDICINE

## 2024-08-12 PROCEDURE — 82306 VITAMIN D 25 HYDROXY: CPT | Performed by: FAMILY MEDICINE

## 2024-08-12 PROCEDURE — 82088 ASSAY OF ALDOSTERONE: CPT | Performed by: FAMILY MEDICINE

## 2024-08-12 PROCEDURE — 99000 SPECIMEN HANDLING OFFICE-LAB: CPT | Performed by: FAMILY MEDICINE

## 2024-08-12 PROCEDURE — 80053 COMPREHEN METABOLIC PANEL: CPT | Performed by: FAMILY MEDICINE

## 2024-08-12 PROCEDURE — 36415 COLL VENOUS BLD VENIPUNCTURE: CPT | Performed by: FAMILY MEDICINE

## 2024-08-12 PROCEDURE — 99214 OFFICE O/P EST MOD 30 MIN: CPT | Performed by: FAMILY MEDICINE

## 2024-08-12 RX ORDER — FLUOXETINE 40 MG/1
40 CAPSULE ORAL DAILY
Qty: 30 CAPSULE | Refills: 1 | Status: CANCELLED | OUTPATIENT
Start: 2024-08-12

## 2024-08-12 RX ORDER — CETIRIZINE HYDROCHLORIDE 10 MG/1
10 TABLET ORAL DAILY PRN
Qty: 90 TABLET | Refills: 4 | Status: SHIPPED | OUTPATIENT
Start: 2024-08-12

## 2024-08-12 RX ORDER — FLUTICASONE PROPIONATE 50 MCG
2 SPRAY, SUSPENSION (ML) NASAL DAILY
Qty: 16 G | Refills: 3 | Status: SHIPPED | OUTPATIENT
Start: 2024-08-12

## 2024-08-12 RX ORDER — CARBOXYMETHYLCELLULOSE SODIUM, GLYCERIN 5; 9 MG/ML; MG/ML
SOLUTION/ DROPS OPHTHALMIC
COMMUNITY
Start: 2024-08-08 | End: 2025-08-03

## 2024-08-12 RX ORDER — LOSARTAN POTASSIUM AND HYDROCHLOROTHIAZIDE 12.5; 5 MG/1; MG/1
1 TABLET ORAL DAILY
Qty: 90 TABLET | Refills: 1 | Status: SHIPPED | OUTPATIENT
Start: 2024-08-12

## 2024-08-12 RX ORDER — MIRTAZAPINE 30 MG/1
30 TABLET, FILM COATED ORAL AT BEDTIME
Qty: 90 TABLET | Refills: 1 | Status: SHIPPED | OUTPATIENT
Start: 2024-08-12

## 2024-08-12 RX ORDER — ACETAMINOPHEN 160 MG
2000 TABLET,DISINTEGRATING ORAL DAILY
Qty: 90 CAPSULE | Refills: 4 | Status: SHIPPED | OUTPATIENT
Start: 2024-08-12

## 2024-08-12 RX ORDER — DULOXETIN HYDROCHLORIDE 60 MG/1
60 CAPSULE, DELAYED RELEASE ORAL DAILY
Qty: 90 CAPSULE | Refills: 1 | Status: SHIPPED | OUTPATIENT
Start: 2024-08-12

## 2024-08-12 ASSESSMENT — PATIENT HEALTH QUESTIONNAIRE - PHQ9
SUM OF ALL RESPONSES TO PHQ QUESTIONS 1-9: 16
10. IF YOU CHECKED OFF ANY PROBLEMS, HOW DIFFICULT HAVE THESE PROBLEMS MADE IT FOR YOU TO DO YOUR WORK, TAKE CARE OF THINGS AT HOME, OR GET ALONG WITH OTHER PEOPLE: SOMEWHAT DIFFICULT
SUM OF ALL RESPONSES TO PHQ QUESTIONS 1-9: 16

## 2024-08-12 NOTE — PROGRESS NOTES
Assessment & Plan     Screen for colon cancer  - Colonoscopy Screening  Referral    Current moderate episode of major depressive disorder without prior episode (H)  Not sure about adherence but pt insists that she is on 2 meds for mental health- one in day and one at night.  Refilled today.  Phq9 remains high but pt notes she is doing well and ready to take better care of herself.  Close follow-up.    - mirtazapine (REMERON) 30 MG tablet  Dispense: 90 tablet; Refill: 1  - DULoxetine (CYMBALTA) 60 MG capsule  Dispense: 90 capsule; Refill: 1    Primary hypertension  BP Readings from Last 3 Encounters:   08/12/24 (!) 166/95   08/15/23 (!) 154/88   07/13/23 (!) 165/89        Uncontrolled today.  Plan for bloodpressure management includes ongoing focus on healthy DASH type diet and increased activity, encouraged to avoid tobacco products and limit alcohol use, stress reduction, pt open to this being a problem for her and accepting treatment today.  Will start losartan/hydrochlorothiazide 50/12.5mg daily.  Check for secondary causes. Reviewed LSM.  Close follow-up in 2-3 months.  Labwork and meds ordered and reviewed as below  Potassium   Date Value Ref Range Status   08/12/2024 4.5 3.4 - 5.3 mmol/L Final      Creatinine   Date Value Ref Range Status   08/12/2024 0.74 0.51 - 0.95 mg/dL Final        - Aldosterone  - Renin activity  - Aldosterone Renin Ratio  - Comprehensive metabolic panel (BMP + Alb, Alk Phos, ALT, AST, Total. Bili, TP)  - Home Blood Pressure Monitor Order for DME - ONLY FOR DME  - losartan-hydrochlorothiazide (HYZAAR) 50-12.5 MG tablet  Dispense: 90 tablet; Refill: 1  - Aldosterone Renin Ratio  - Comprehensive metabolic panel (BMP + Alb, Alk Phos, ALT, AST, Total. Bili, TP)    Graves disease  Off meds for several months now with normal TSH.      Hyperthyroidism  Off methimazole for several months now with normal thyroid- continue to monitor for hypothyroidism to follow.    - TSH with free T4  reflex  - TSH with free T4 reflex    Prediabetes  Improved with LSM   - Hemoglobin A1c  - Hemoglobin A1c    Thrombocytopenia (H24)  Stable- asymptomatic.   - CBC with platelets  - CBC with platelets    Vitamin D deficiency  Restart vitamin D daily   - Vitamin D Deficiency  - Cholecalciferol (VITAMIN D3) 50 MCG (2000 UT) CAPS  Dispense: 90 capsule; Refill: 4  - Vitamin D Deficiency    Encounter for screening mammogram for malignant neoplasm of breast  - MA Screen Bilateral w/Daniel    Allergic rhinitis, unspecified seasonality, unspecified trigger  - cetirizine (ZYRTEC) 10 MG tablet  Dispense: 90 tablet; Refill: 4  - fluticasone (FLONASE) 50 MCG/ACT nasal spray  Dispense: 16 g; Refill: 3    Screening examination for pulmonary tuberculosis  - Quantiferon TB Gold Plus  - Quantiferon TB Gold Plus        Subjective   Gold Jani is a 59 year old, presenting for the following health issues:  RECHECK    Routine labs     Diabetes checkup-Ran out of thyroid medication 2-3 months ago     Thyroid checkup     Last period was 2 years ago- gets very hot at night, can't use blankets.  Wondering if this is normal menopause or something is medically wrong with her.    Med refills     Depression medication at bedtime and duloxetine.       Nasal spray helped with ear pain but not having a problems with her ears anymore.      Planning on doing mammogram next summer.      Declining vaccines recommended today- reviewed tetanus and shingles.      Eye doctor at [facility] this summer -  has follow-up due to some problems with her eyes     PHQ-9 score:        8/12/2024     2:17 PM   PHQ   PHQ-9 Total Score 16   Q9: Thoughts of better off dead/self-harm past 2 weeks Not at all           8/12/2024     2:12 PM   Additional Questions   Roomed by M   Accompanied by self     History of Present Illness       Hypothyroidism:     Since last visit, patient describes the following symptoms::  Anxiety and Weight loss    Weight loss::  5 lbs.    She eats  "0-1 servings of fruits and vegetables daily.She consumes 1 sweetened beverage(s) daily.She exercises with enough effort to increase her heart rate 20 to 29 minutes per day.  She exercises with enough effort to increase her heart rate 7 days per week. She is missing 2 dose(s) of medications per week.  She is not taking prescribed medications regularly due to remembering to take.           Objective    BP (!) 166/95 (BP Location: Left arm, Patient Position: Sitting, Cuff Size: Adult Regular)   Pulse 60   Temp 97.7  F (36.5  C) (Temporal)   Resp 20   Ht 1.53 m (5' 0.24\")   Wt 57.2 kg (126 lb)   SpO2 98%   BMI 24.42 kg/m    Body mass index is 24.42 kg/m .  Physical Exam   Complete 10 point ROS completed today as part of the exam and patient denies any symptoms as reviewed in HPI     Wt Readings from Last 3 Encounters:   08/12/24 57.2 kg (126 lb)   07/13/23 55.3 kg (122 lb)   04/10/23 56.8 kg (125 lb 4 oz)       No LMP recorded. Patient is premenopausal.    All normal as below except abnormalities include: grossly normal and stable exam   General is a  59 year old sitting comfortably in no apparent distress   HEENT:  TM are clear bilaterally.  Eye exams within normal   Neck: Supple without lymphadenopathy or thyromegally  CV: Regular rate and rhythm S1S2 without rubs, murmurs or gallops,   Lungs: Clear to auscultation bilaterally  Abd:  +BS, soft NT/ND,  No masses or organomegally,   Extremities: Warm, No Edema, 2+ Pedal and radial pulses bilaterally  Skin: No lesions or rashes noted  Neuro: Able to ambulate around the exam room with equal movement, strength and normal coordination of the upper and lower extremeties symmetrically    History summarized from1-2:MTM 8/2023- good adherence to meds.  Transitioning to duloxetine from fluoxetine.  Restartin vitamin D.  Working on reducing methimazole dosing.  Not willing to start bp meds.    Old Records-1: Outside allergies, meds, problems and immunizations were " reconciled as needed from CareEverywhere  Radiology tests reviewed-1: mammogram 8/2023 normal   Lab tests reviewed-1: 2023     Follow-up Visit   Expected date:  Nov 12, 2024 (Approximate)      Follow Up Appointment Details:     Follow-up with whom?: PCP    Follow-Up for what?: Adult Preventive    How?: In Person                     Nava Tran MD         Signed Electronically by: Nava Tran MD      Prior to immunization administration, verified patients identity using patient s name and date of birth. Please see Immunization Activity for additional information.     Screening Questionnaire for Adult Immunization    Are you sick today?   No   Do you have allergies to medications, food, a vaccine component or latex?   No   Have you ever had a serious reaction after receiving a vaccination?   No   Do you have a long-term health problem with heart, lung, kidney, or metabolic disease (e.g., diabetes), asthma, a blood disorder, no spleen, complement component deficiency, a cochlear implant, or a spinal fluid leak?  Are you on long-term aspirin therapy?   No   Do you have cancer, leukemia, HIV/AIDS, or any other immune system problem?   No   Do you have a parent, brother, or sister with an immune system problem?   No   In the past 3 months, have you taken medications that affect  your immune system, such as prednisone, other steroids, or anticancer drugs; drugs for the treatment of rheumatoid arthritis, Crohn s disease, or psoriasis; or have you had radiation treatments?   No   Have you had a seizure, or a brain or other nervous system problem?   No   During the past year, have you received a transfusion of blood or blood    products, or been given immune (gamma) globulin or antiviral drug?   No   For women: Are you pregnant or is there a chance you could become       pregnant during the next month?   No   Have you received any vaccinations in the past 4 weeks?   No     Immunization questionnaire answers were all  negative.      Patient instructed to remain in clinic for 15 minutes afterwards, and to report any adverse reactions.     Screening performed by FLORENCE Nelson MA on 8/12/2024 at 2:31 PM.

## 2024-08-13 LAB
ALBUMIN SERPL BCG-MCNC: 4.3 G/DL (ref 3.5–5.2)
ALP SERPL-CCNC: 67 U/L (ref 40–150)
ALT SERPL W P-5'-P-CCNC: 32 U/L (ref 0–50)
ANION GAP SERPL CALCULATED.3IONS-SCNC: 10 MMOL/L (ref 7–15)
AST SERPL W P-5'-P-CCNC: 29 U/L (ref 0–45)
BILIRUB SERPL-MCNC: 0.4 MG/DL
BUN SERPL-MCNC: 19.1 MG/DL (ref 8–23)
CALCIUM SERPL-MCNC: 9.5 MG/DL (ref 8.8–10.4)
CHLORIDE SERPL-SCNC: 104 MMOL/L (ref 98–107)
CREAT SERPL-MCNC: 0.74 MG/DL (ref 0.51–0.95)
EGFRCR SERPLBLD CKD-EPI 2021: >90 ML/MIN/1.73M2
GLUCOSE SERPL-MCNC: 96 MG/DL (ref 70–99)
HCO3 SERPL-SCNC: 27 MMOL/L (ref 22–29)
POTASSIUM SERPL-SCNC: 4.5 MMOL/L (ref 3.4–5.3)
PROT SERPL-MCNC: 7.2 G/DL (ref 6.4–8.3)
SODIUM SERPL-SCNC: 141 MMOL/L (ref 135–145)
TSH SERPL DL<=0.005 MIU/L-ACNC: 0.37 UIU/ML (ref 0.3–4.2)
VIT D+METAB SERPL-MCNC: 24 NG/ML (ref 20–50)

## 2024-08-15 LAB — RENIN PLAS-CCNC: 0.3 NG/ML/HR

## 2024-08-16 LAB
ALDOST SERPL-MCNC: 6.6 NG/DL (ref 0–31)
ALDOST/RENIN PLAS-RTO: 22 {RATIO} (ref 0–25)

## 2024-08-26 ENCOUNTER — TELEPHONE (OUTPATIENT)
Dept: FAMILY MEDICINE | Facility: CLINIC | Age: 59
End: 2024-08-26
Payer: COMMERCIAL

## 2024-08-26 PROBLEM — E05.90 HYPERTHYROIDISM: Status: RESOLVED | Noted: 2019-10-03 | Resolved: 2024-08-26

## 2024-08-26 NOTE — TELEPHONE ENCOUNTER
----- Message from Nava Luzjose sent at 8/26/2024  2:36 PM CDT -----  Team - please call patient with results and send result letter with this information if patient desires for their records. Refer to [employer] result note as needed.      Her blood counts are healthy- no anemia   Her hormone levels are healthy   Normal thyroid level - no need for medications at this time   Healthy vitamin D level   Healthy liver and kidney tests   Sugars are doing much better

## 2024-10-01 ENCOUNTER — MYC MEDICAL ADVICE (OUTPATIENT)
Dept: FAMILY MEDICINE | Facility: CLINIC | Age: 59
End: 2024-10-01
Payer: COMMERCIAL

## 2024-10-04 NOTE — TELEPHONE ENCOUNTER
1st call attempt to follow-up on mychart message regarding menstrual cycle concerns. LM on VM to call back and ask to speak to triage nurse.     Will need to be triage if pt returns call.     Leeann HARPER RN  [facility] [place] (supporting Nava Tran MD)3 days ago     Hi Dr. Tran,     I am wondering about my menstrual cycle. It has been 2 years since I have had a period but recently from 9/10 - 9/17 I got my period. I want to know if you have seen cases like mines. Should I be worried or do I need to be seen? It was like a regular period and usually lasts 5 days but this one lasted 7 days. I want to make sure if anything needs to be tested as well because I just find that odd that I get my period out of the blue like that. Thanks.

## 2024-10-05 ENCOUNTER — HEALTH MAINTENANCE LETTER (OUTPATIENT)
Age: 59
End: 2024-10-05

## 2024-10-07 ENCOUNTER — APPOINTMENT (OUTPATIENT)
Dept: INTERPRETER SERVICES | Facility: CLINIC | Age: 59
End: 2024-10-07
Payer: COMMERCIAL

## 2024-10-07 ENCOUNTER — NURSE TRIAGE (OUTPATIENT)
Dept: FAMILY MEDICINE | Facility: CLINIC | Age: 59
End: 2024-10-07
Payer: COMMERCIAL

## 2024-10-07 NOTE — TELEPHONE ENCOUNTER
See triage TE 10/7/24    Bud KING RN  [facility] Primary [facility]

## 2024-10-07 NOTE — TELEPHONE ENCOUNTER
"SEE PCP WITHIN 2 WEEKS:  * You need to be seen for this ongoing problem within the next 2 weeks.   * Schedule an appointment on 10/14/24 at 1140    Reason for Disposition   [1] Menopause symptoms (e.g., hot flashes, irregular periods, vaginal dryness) AND [2] interfere with normal activities like work or sleep) AND [3] no improvement after using Care Advice    Answer Assessment - Initial Assessment Questions  1. MAIN SYMPTOM: \"What is your main symptom?\" \"How bad is it?\"  (e.g., difficulty concentrating, hot flashes, fatigue, irregular periods, vaginal dryness)      Irregular period    2. ONSET: \"When did the period begin?\" (e.g., hours, days or weeks ago)      9/10/2024. Seven days.    3. MENOPAUSE: \"When was your last menstrual period?\"      Last time I got my period was March 2022.     4. MEDICINES: \"Are you taking any hormone prescription or over-the-counter medicines?\" (e.g., estrogen; estrogen/progesterone, herbal supplements, medicines for mood, new medicines, Niacin)     My prescription medications from my doctor such as anti-depression medication, D supplement, BP medication.     5. OTHER SYMPTOMS: \"Do you have any other symptoms?\" (e.g., insomnia, mood swings)      Not able to sleep well    6. CALLER'S COPING: \"How are you doing?\" (e.g., anxiety, depression, emotional state with menopause symptoms)      Anxiety, depression, worrisome     7. TREATMENTS AND RESPONSE: \"What have you done so far to try to make this better?\"      No    8. PREGNANCY: \"Is there any chance you are pregnant?\" \"When was your last menstrual period?\"      LMP 9/10/24    Protocols used: Menopause Symptoms and [facility]yvjgc-M[facility]     "

## 2024-10-14 ENCOUNTER — OFFICE VISIT (OUTPATIENT)
Dept: FAMILY MEDICINE | Facility: CLINIC | Age: 59
End: 2024-10-14
Payer: COMMERCIAL

## 2024-10-14 VITALS
HEART RATE: 73 BPM | BODY MASS INDEX: 23.75 KG/M2 | RESPIRATION RATE: 18 BRPM | DIASTOLIC BLOOD PRESSURE: 80 MMHG | SYSTOLIC BLOOD PRESSURE: 129 MMHG | HEIGHT: 60 IN | OXYGEN SATURATION: 97 % | WEIGHT: 121 LBS | TEMPERATURE: 98.1 F

## 2024-10-14 DIAGNOSIS — N95.0 POST-MENOPAUSAL BLEEDING: Primary | ICD-10-CM

## 2024-10-14 DIAGNOSIS — Z23 NEED FOR COVID-19 VACCINE: ICD-10-CM

## 2024-10-14 DIAGNOSIS — R05.1 ACUTE COUGH: ICD-10-CM

## 2024-10-14 DIAGNOSIS — Z23 NEEDS FLU SHOT: ICD-10-CM

## 2024-10-14 PROCEDURE — 99214 OFFICE O/P EST MOD 30 MIN: CPT | Mod: 25 | Performed by: STUDENT IN AN ORGANIZED HEALTH CARE EDUCATION/TRAINING PROGRAM

## 2024-10-14 PROCEDURE — 90673 RIV3 VACCINE NO PRESERV IM: CPT | Performed by: STUDENT IN AN ORGANIZED HEALTH CARE EDUCATION/TRAINING PROGRAM

## 2024-10-14 PROCEDURE — 90471 IMMUNIZATION ADMIN: CPT | Performed by: STUDENT IN AN ORGANIZED HEALTH CARE EDUCATION/TRAINING PROGRAM

## 2024-10-14 PROCEDURE — T1013 SIGN LANG/ORAL INTERPRETER: HCPCS | Mod: U4 | Performed by: INTERPRETER

## 2024-10-14 RX ORDER — BENZONATATE 100 MG/1
100 CAPSULE ORAL 3 TIMES DAILY PRN
Qty: 30 CAPSULE | Refills: 1 | Status: SHIPPED | OUTPATIENT
Start: 2024-10-14 | End: 2024-10-23

## 2024-10-14 ASSESSMENT — ANXIETY QUESTIONNAIRES
2. NOT BEING ABLE TO STOP OR CONTROL WORRYING: SEVERAL DAYS
GAD7 TOTAL SCORE: 5
8. IF YOU CHECKED OFF ANY PROBLEMS, HOW DIFFICULT HAVE THESE MADE IT FOR YOU TO DO YOUR WORK, TAKE CARE OF THINGS AT HOME, OR GET ALONG WITH OTHER PEOPLE?: SOMEWHAT DIFFICULT
7. FEELING AFRAID AS IF SOMETHING AWFUL MIGHT HAPPEN: SEVERAL DAYS
3. WORRYING TOO MUCH ABOUT DIFFERENT THINGS: NOT AT ALL
GAD7 TOTAL SCORE: 5
5. BEING SO RESTLESS THAT IT IS HARD TO SIT STILL: SEVERAL DAYS
1. FEELING NERVOUS, ANXIOUS, OR ON EDGE: SEVERAL DAYS
7. FEELING AFRAID AS IF SOMETHING AWFUL MIGHT HAPPEN: SEVERAL DAYS
6. BECOMING EASILY ANNOYED OR IRRITABLE: SEVERAL DAYS
IF YOU CHECKED OFF ANY PROBLEMS ON THIS QUESTIONNAIRE, HOW DIFFICULT HAVE THESE PROBLEMS MADE IT FOR YOU TO DO YOUR WORK, TAKE CARE OF THINGS AT HOME, OR GET ALONG WITH OTHER PEOPLE: SOMEWHAT DIFFICULT
GAD7 TOTAL SCORE: 5
4. TROUBLE RELAXING: NOT AT ALL

## 2024-10-14 ASSESSMENT — PATIENT HEALTH QUESTIONNAIRE - PHQ9
10. IF YOU CHECKED OFF ANY PROBLEMS, HOW DIFFICULT HAVE THESE PROBLEMS MADE IT FOR YOU TO DO YOUR WORK, TAKE CARE OF THINGS AT HOME, OR GET ALONG WITH OTHER PEOPLE: SOMEWHAT DIFFICULT
SUM OF ALL RESPONSES TO PHQ QUESTIONS 1-9: 21
SUM OF ALL RESPONSES TO PHQ QUESTIONS 1-9: 21

## 2024-10-14 NOTE — PROGRESS NOTES
Norberto Gunter was seen today for talk about her health.    Diagnoses and all orders for this visit:    Post-menopausal bleeding  Has been in menopause for 2 years, however in 9/10/24, had a period.  Seemed normal duration and amount.  Has not had any bleeding since. denies any abdominal pain, masses, changes in medication.  Declined physical exam.  Has physical with PCP in December would like to defer it until then.  Discussed with patient that with postmenopausal bleeding, main concern would be endometrial cancer, recommend endometrial biopsy.  Patient declined.  Recommended TVUS, patient declined.  Patient reports that she will discuss with her  to see if he is okay with her doing these tests.  Will let us know if she decides to  -Await for patient response  -Continue to monitor  -PCP in December, consider physical exam    Acute cough  Reports cough for 1 month.  Initially dry, nonproductive.  Denies fever or SOB.  Declined CXR.  Trial Tessalon Perles.  -     benzonatate (TESSALON) 100 MG capsule; Take 1 capsule (100 mg) by mouth 3 times daily as needed for cough.  -RTC if not improved    Needs flu shot  -     INFLUENZA VACCINE TRIVALENT(FLUBLOK)    Need for COVID-19 vaccine  Discussed, patient declined.        Subjective   Norberto uGnter is a 59 year old, presenting for the following health issues:  talk about her health        10/14/2024    11:45 AM   Additional Questions   Roomed by jennifer yarbrough   Accompanied by self     History of Present Illness       Reason for visit:  Talk about health problem   She is taking medications regularly.       Has been in menopause for 2 years, however in 9/10/24, had a period.   Period seemed normal, normal duration and amount.  Sometimes dose have some stress that if she passes away, if her kids will be ok.   Has not tried any new meds.   Denies abdominal pain or masses.           8/15/2023     9:40 AM 8/12/2024     2:17 PM 10/14/2024    11:45 AM   PHQ   PHQ-9 Total Score 17 16 21  "  Q9: Thoughts of better off dead/self-harm past 2 weeks Not at all Not at all Not at all         10/14/2024    11:47 AM   JULI-7 SCORE   Total Score 5 (mild anxiety)   Total Score 5           Objective    /80 (BP Location: Left arm, Patient Position: Sitting, Cuff Size: Adult Regular)   Pulse 73   Temp 98.1  F (36.7  C) (Oral)   Resp 18   Ht 1.52 m (4' 11.84\")   Wt 54.9 kg (121 lb)   SpO2 97%   BMI 23.76 kg/m    Body mass index is 23.76 kg/m .  Physical Exam   General: Well developed, well nourished.  Skin:  Dry without rash.    Head:  Normocephalic-atraumatic.    Eye:  Normal conjunctivae.     Respiratory:  Normal respiratory effort.   Gastrointestinal:  Non-distended.    Musculoskeletal:  No deformity or edema.  Neurologic: No focal deficits.          Signed Electronically by: Sheridan Apple MD Prior to immunization administration, verified patients identity using patient s name and date of birth. Please see Immunization Activity for additional information.     Screening Questionnaire for Adult Immunization    Are you sick today?   Don't Know   Do you have allergies to medications, food, a vaccine component or latex?   No   Have you ever had a serious reaction after receiving a vaccination?   No   Do you have a long-term health problem with heart, lung, kidney, or metabolic disease (e.g., diabetes), asthma, a blood disorder, no spleen, complement component deficiency, a cochlear implant, or a spinal fluid leak?  Are you on long-term aspirin therapy?   No   Do you have cancer, leukemia, HIV/AIDS, or any other immune system problem?   No   Do you have a parent, brother, or sister with an immune system problem?   No   In the past 3 months, have you taken medications that affect  your immune system, such as prednisone, other steroids, or anticancer drugs; drugs for the treatment of rheumatoid arthritis, Crohn s disease, or psoriasis; or have you had radiation treatments?   No   Have you had a seizure, or a " brain or other nervous system problem?   Yes   During the past year, have you received a transfusion of blood or blood    products, or been given immune (gamma) globulin or antiviral drug?   No   For women: Are you pregnant or is there a chance you could become       pregnant during the next month?   No   Have you received any vaccinations in the past 4 weeks?   No     Immunization questionnaire was positive for at least one answer.  Notified       Patient instructed to remain in clinic for 15 minutes afterwards, and to report any adverse reactions.     Screening performed by Josie Mills MA on 10/14/2024 at 11:52 AM.

## 2024-10-22 DIAGNOSIS — E05.90 HYPERTHYROIDISM: ICD-10-CM

## 2024-10-22 NOTE — TELEPHONE ENCOUNTER
Clinic RN-Please contact patient to clarify whether patient is still taking this medication/dose/frequency; medication not active on med list.  Please route findings to PCP.    Thank you!  ELPIDIO Fonseca, RN-[facility]

## 2024-10-23 ENCOUNTER — VIRTUAL VISIT (OUTPATIENT)
Dept: INTERPRETER SERVICES | Facility: CLINIC | Age: 59
End: 2024-10-23
Payer: COMMERCIAL

## 2024-10-23 ENCOUNTER — APPOINTMENT (OUTPATIENT)
Dept: RADIOLOGY | Facility: HOSPITAL | Age: 59
End: 2024-10-23
Payer: COMMERCIAL

## 2024-10-23 ENCOUNTER — HOSPITAL ENCOUNTER (EMERGENCY)
Facility: HOSPITAL | Age: 59
Discharge: HOME | End: 2024-10-23
Attending: EMERGENCY MEDICINE | Admitting: EMERGENCY MEDICINE
Payer: COMMERCIAL

## 2024-10-23 VITALS
HEIGHT: 60 IN | OXYGEN SATURATION: 98 % | WEIGHT: 126 LBS | RESPIRATION RATE: 16 BRPM | DIASTOLIC BLOOD PRESSURE: 66 MMHG | TEMPERATURE: 97.9 F | BODY MASS INDEX: 24.74 KG/M2 | SYSTOLIC BLOOD PRESSURE: 122 MMHG | HEART RATE: 55 BPM

## 2024-10-23 DIAGNOSIS — E05.90 HYPERTHYROIDISM: ICD-10-CM

## 2024-10-23 DIAGNOSIS — R05.1 ACUTE COUGH: ICD-10-CM

## 2024-10-23 DIAGNOSIS — J18.9 PNEUMONIA OF RIGHT LUNG DUE TO INFECTIOUS ORGANISM, UNSPECIFIED PART OF LUNG: ICD-10-CM

## 2024-10-23 LAB
ALBUMIN SERPL BCG-MCNC: 3.7 G/DL (ref 3.5–5.2)
ALP SERPL-CCNC: 105 U/L (ref 40–150)
ALT SERPL W P-5'-P-CCNC: 246 U/L (ref 0–50)
ANION GAP SERPL CALCULATED.3IONS-SCNC: 7 MMOL/L (ref 7–15)
AST SERPL W P-5'-P-CCNC: 44 U/L (ref 0–45)
BASOPHILS # BLD AUTO: 0 10E3/UL (ref 0–0.2)
BASOPHILS NFR BLD AUTO: 0 %
BILIRUB DIRECT SERPL-MCNC: <0.2 MG/DL (ref 0–0.3)
BILIRUB SERPL-MCNC: 0.4 MG/DL
BUN SERPL-MCNC: 18.6 MG/DL (ref 8–23)
CALCIUM SERPL-MCNC: 9.3 MG/DL (ref 8.8–10.4)
CHLORIDE SERPL-SCNC: 105 MMOL/L (ref 98–107)
CREAT SERPL-MCNC: 0.55 MG/DL (ref 0.51–0.95)
EGFRCR SERPLBLD CKD-EPI 2021: >90 ML/MIN/1.73M2
EOSINOPHIL # BLD AUTO: 0.1 10E3/UL (ref 0–0.7)
EOSINOPHIL NFR BLD AUTO: 1 %
ERYTHROCYTE [DISTWIDTH] IN BLOOD BY AUTOMATED COUNT: 11.6 % (ref 10–15)
FLUAV RNA SPEC QL NAA+PROBE: NEGATIVE
FLUBV RNA RESP QL NAA+PROBE: NEGATIVE
GLUCOSE SERPL-MCNC: 103 MG/DL (ref 70–99)
GROUP A STREP BY PCR: NOT DETECTED
HCO3 SERPL-SCNC: 29 MMOL/L (ref 22–29)
HCT VFR BLD AUTO: 39.9 % (ref 35–47)
HGB BLD-MCNC: 13 G/DL (ref 11.7–15.7)
HOLD SPECIMEN: NORMAL
HOLD SPECIMEN: NORMAL
IMM GRANULOCYTES # BLD: 0.1 10E3/UL
IMM GRANULOCYTES NFR BLD: 1 %
LIPASE SERPL-CCNC: 84 U/L (ref 13–60)
LYMPHOCYTES # BLD AUTO: 3.1 10E3/UL (ref 0.8–5.3)
LYMPHOCYTES NFR BLD AUTO: 36 %
MAGNESIUM SERPL-MCNC: 1.9 MG/DL (ref 1.7–2.3)
MCH RBC QN AUTO: 28.5 PG (ref 26.5–33)
MCHC RBC AUTO-ENTMCNC: 32.6 G/DL (ref 31.5–36.5)
MCV RBC AUTO: 88 FL (ref 78–100)
MONOCYTES # BLD AUTO: 0.8 10E3/UL (ref 0–1.3)
MONOCYTES NFR BLD AUTO: 9 %
NEUTROPHILS # BLD AUTO: 4.7 10E3/UL (ref 1.6–8.3)
NEUTROPHILS NFR BLD AUTO: 54 %
NRBC # BLD AUTO: 0 10E3/UL
NRBC BLD AUTO-RTO: 0 /100
PLATELET # BLD AUTO: 146 10E3/UL (ref 150–450)
POTASSIUM SERPL-SCNC: 3.9 MMOL/L (ref 3.4–5.3)
PROT SERPL-MCNC: 6.8 G/DL (ref 6.4–8.3)
RBC # BLD AUTO: 4.56 10E6/UL (ref 3.8–5.2)
RSV RNA SPEC NAA+PROBE: NEGATIVE
SARS-COV-2 RNA RESP QL NAA+PROBE: NEGATIVE
SODIUM SERPL-SCNC: 141 MMOL/L (ref 135–145)
T3 SERPL-MCNC: 328 NG/DL (ref 85–202)
T4 FREE SERPL-MCNC: 5.5 NG/DL (ref 0.9–1.7)
TROPONIN T SERPL HS-MCNC: 30 NG/L
TROPONIN T SERPL HS-MCNC: 32 NG/L
TSH SERPL DL<=0.005 MIU/L-ACNC: <0.01 UIU/ML (ref 0.3–4.2)
WBC # BLD AUTO: 8.7 10E3/UL (ref 4–11)

## 2024-10-23 PROCEDURE — 71046 X-RAY EXAM CHEST 2 VIEWS: CPT

## 2024-10-23 PROCEDURE — 82248 BILIRUBIN DIRECT: CPT

## 2024-10-23 PROCEDURE — T1013 SIGN LANG/ORAL INTERPRETER: HCPCS | Mod: U4,TEL,95 | Performed by: INTERPRETER

## 2024-10-23 PROCEDURE — 83690 ASSAY OF LIPASE: CPT

## 2024-10-23 PROCEDURE — 99285 EMERGENCY DEPT VISIT HI MDM: CPT | Mod: 25

## 2024-10-23 PROCEDURE — 85025 COMPLETE CBC W/AUTO DIFF WBC: CPT

## 2024-10-23 PROCEDURE — 36415 COLL VENOUS BLD VENIPUNCTURE: CPT

## 2024-10-23 PROCEDURE — 84480 ASSAY TRIIODOTHYRONINE (T3): CPT

## 2024-10-23 PROCEDURE — 93005 ELECTROCARDIOGRAM TRACING: CPT

## 2024-10-23 PROCEDURE — 84484 ASSAY OF TROPONIN QUANT: CPT

## 2024-10-23 PROCEDURE — 84439 ASSAY OF FREE THYROXINE: CPT

## 2024-10-23 PROCEDURE — 87637 SARSCOV2&INF A&B&RSV AMP PRB: CPT

## 2024-10-23 PROCEDURE — 87651 STREP A DNA AMP PROBE: CPT

## 2024-10-23 PROCEDURE — 83735 ASSAY OF MAGNESIUM: CPT

## 2024-10-23 PROCEDURE — 250N000013 HC RX MED GY IP 250 OP 250 PS 637

## 2024-10-23 PROCEDURE — 84443 ASSAY THYROID STIM HORMONE: CPT

## 2024-10-23 RX ORDER — BENZONATATE 100 MG/1
200 CAPSULE ORAL 3 TIMES DAILY PRN
Qty: 30 CAPSULE | Refills: 1 | Status: SHIPPED | OUTPATIENT
Start: 2024-10-23

## 2024-10-23 RX ORDER — DOXYCYCLINE 100 MG/1
100 CAPSULE ORAL 2 TIMES DAILY
Qty: 14 CAPSULE | Refills: 0 | Status: SHIPPED | OUTPATIENT
Start: 2024-10-23 | End: 2024-10-30

## 2024-10-23 RX ORDER — ATENOLOL 25 MG/1
25 TABLET ORAL ONCE
Status: COMPLETED | OUTPATIENT
Start: 2024-10-23 | End: 2024-10-23

## 2024-10-23 RX ORDER — METHIMAZOLE 10 MG/1
30 TABLET ORAL DAILY
Status: DISCONTINUED | OUTPATIENT
Start: 2024-10-23 | End: 2024-10-23 | Stop reason: HOSPADM

## 2024-10-23 RX ORDER — METHIMAZOLE 10 MG/1
20 TABLET ORAL DAILY
Qty: 60 TABLET | Refills: 0 | Status: SHIPPED | OUTPATIENT
Start: 2024-10-23

## 2024-10-23 RX ORDER — DOXYCYCLINE 100 MG/1
100 CAPSULE ORAL EVERY 12 HOURS SCHEDULED
Status: DISCONTINUED | OUTPATIENT
Start: 2024-10-23 | End: 2024-10-23 | Stop reason: HOSPADM

## 2024-10-23 RX ADMIN — AMOXICILLIN AND CLAVULANATE POTASSIUM 1 TABLET: 875; 125 TABLET, FILM COATED ORAL at 16:36

## 2024-10-23 RX ADMIN — ATENOLOL 25 MG: 25 TABLET ORAL at 16:37

## 2024-10-23 RX ADMIN — METHIMAZOLE 30 MG: 10 TABLET ORAL at 16:37

## 2024-10-23 ASSESSMENT — COLUMBIA-SUICIDE SEVERITY RATING SCALE - C-SSRS
2. HAVE YOU ACTUALLY HAD ANY THOUGHTS OF KILLING YOURSELF IN THE PAST MONTH?: NO
6. HAVE YOU EVER DONE ANYTHING, STARTED TO DO ANYTHING, OR PREPARED TO DO ANYTHING TO END YOUR LIFE?: NO
1. IN THE PAST MONTH, HAVE YOU WISHED YOU WERE DEAD OR WISHED YOU COULD GO TO SLEEP AND NOT WAKE UP?: NO

## 2024-10-23 ASSESSMENT — ACTIVITIES OF DAILY LIVING (ADL)
ADLS_ACUITY_SCORE: 0
ADLS_ACUITY_SCORE: 0

## 2024-10-23 NOTE — DISCHARGE INSTRUCTIONS
You were seen here in the emergency department for palpitations.  Your workup here revealed that you do have elevations of your thyroid hormone secondary to your thyroid dysfunction.  This can cause the symptoms you are describing.  I will get you started back on the methimazole that was prescribed by the endocrinologist.  They are also recommending you start a medication called atenolol which regulates your heart rate but your heart rate is too low right now and the medication causes low heart rate. If your endocrinologist recommends you be on it, they can prescribe it for you.   You had a cough for about 1 month and your chest x-ray does reveal pneumonia so we will get you treated with some antibiotics.  For the cough, you can take benzonatate.  If you start to feel worse or develop any new or worsening symptoms, return to the emergency department for reevaluation.

## 2024-10-23 NOTE — PROGRESS NOTES
Informal Recommendations Note    I was called by Val Major on 10/23/24 to provide input for Norberto Onofre. The nature of this request for input does not permit comprehensive review of health records or patient interview.  I was not requested or am not able to personally examine the patient at this time.    Norberto Onofre is a 59 year old woman with history of Graves disease who is now presenting with symptomatic hyperthyroidism. She has followed with Endocrine through [facility] in the past. Graves disease was previously treated with methimazole; this medication had been stopped because of Graves remission. On 8/12/2024 TSH was 0.37.     Labs in the ED today are as follows:    Latest Ref Rng 10/23/2024  1:11 PM   ENDO THYROID LABS-[facility]     TSH 0.30 - 4.20 uIU/mL <0.01 (L)    FREE T4 0.90 - 1.70 ng/dL 5.50 (H)       Legend:  (L) Low  (H) High    Based on the information provided, my recommendations are as follows:   - Add on total T3 level to blood already drawn today   - Start methimazole 20 mg daily   - Consider starting a beta blocker to help with symptomatic control, with attention to HR   - She will need close follow up with Endocrine and PCP; she is already established with the [facility]     These recommendations are not intended to take the place of the care team's clinical judgement, which should always be utilized to provide the most appropriate care to meet the unique needs of each patient.     Jazzy Soliz MD  Endocrinology and Diabetes

## 2024-10-23 NOTE — ED TRIAGE NOTES
Patient states she has hyperthyroidism, taken off medications in August, now having symptoms of palpitations and itching again.  Also, Coughing, x1 month.       Triage Assessment (Adult)       Row Name 10/23/24 1142          Triage Assessment    Airway WDL WDL        Respiratory WDL    Respiratory WDL WDL        Skin Circulation/Temperature WDL    Skin Circulation/Temperature WDL WDL        Cardiac WDL    Cardiac WDL all        Peripheral/Neurovascular WDL    Peripheral Neurovascular WDL WDL        Cognitive/Neuro/Behavioral WDL    Cognitive/Neuro/Behavioral WDL WDL

## 2024-10-23 NOTE — ED PROVIDER NOTES
EMERGENCY DEPARTMENT ENCOUNTER      NAME: Norberto Onofre  AGE: 59 year old female  YOB: 1965  MRN: [id number]  EVALUATION DATE & TIME: No admission date for patient encounter.    PCP: Nava Tran    ED PROVIDER: Val Major PA-C    FINAL IMPRESSION:   Hyperthyroidism     CHIEF COMPLAINT:  Palpitations     MEDICAL DECISION MAKING:  Norberto Onofre is a 59 year old female with a pertinent history of hyperthyroidism who presents to this ED by walk in for evaluation of palpitations for the past few days. Vitals reviewed and unremarkable. No tachycardia, hypoxia, hypotension. On exam, anxious appearing. Diaphoretic. Heart with RRR. Lungs CTA. Abdomen non-tender. No rashes noted.     Highest on my differential is hyperthyroidism with no longer taking methimazole, specifically thyroid storm or thyroid crisis.  Although also considering arrhythmias, ACS, viral URI, strep, electrolyte abnormalities, pneumonia, bronchitis, among many considered. Workup revealed troponin 30, delta troponin 32.  EKG showed normal sinus rhythm without any arrhythmias or ST elevation so ACS and life-threatening arrhythmia ruled out.  Group A strep swab negative.  Viral swabs negative.  Stable electrolytes and kidney function.  ALT elevated at 246 but AST within normal limits.      Patient's TSH is undetectable.  T4 is 5.5.  I do believe that her symptoms are secondary to hyperthyroidism.  I do not suspect that she is in a thyroid storm though.  Vitals are consistently stable here.  She is overall fairly well-appearing other than a bit anxious appearing.  Called and spoke to Dr. Soliz with endocrinology who recommended methimazole 30 mg daily as well as atenolol daily for the palpitations.  On further chart review with pharmacy, it was discovered that the most the patient had ever taken at least over the past 5 years was methimazole 10 mg daily.  I did call endocrinology back and verify the dose and she did confirm that the 30 mg  would be a sufficient dose but if there were concerns about jumping too high from the patient's previous dose, could do 20 mg but that we really should be doing a higher dose with how abnormal her labs are here.  There also was concerns with her being borderline bradycardic.  I did give 1 singular dose of atenolol here per the recommendation of the endocrinologist but will not plan to send her home with any with concerns that she becomes bradycardic at home.    Her chest x-ray also shows evidence of a right lower lobe pneumonia.  She has been having a cough for about a month and declined x-ray at her PCP.  She is vitally stable here without any evidence of hypoxia, fevers, tachypnea.  Will treat her with doxycycline and Augmentin.  Recommended she follow back up with her endocrinologist.  Discussed strict return precautions.  She was discharged from the ER in stable condition.    ED COURSE  Pertinent Labs & Imaging studies reviewed. (See chart for details)  1245 I met with the patient and obtained a history and performed a physical exam  2000 I discussed plan for discharge. Return precautions were discussed. Discharged by ED RN.     ED Course as of 10/23/24 2010   Wed Oct 23, 2024   1356 Chest xray revealed new opacity in the RLL which could be consistent with an atypical infection. Will plan to treat for CAP with augmentin and doxycyline - giving first dose here.     Vitals are stable here. No hypoxia, tachypnea, tachycardia. No respiratory distress. Overall well appearing.    1423 Troponin T, High Sensitivity(!): 30  Elevated. Nothing to compare it to. Patient not having chest pain but feeling short of breath with palpitations. Will obtain delta troponin. EKG without ST elevation/depression or findings concerning for ACS.   1453 TSH(!): <0.01       Medical Decision Making  Obtained supplemental history:Supplemental history obtained?: No  Reviewed external records: External records reviewed?: Documented in chart  and Outpatient Record: Office visit from 10/14/2024.  Patient reported a cough for 1 month.  She declined x-ray in the office.  Was instructed to come back if things had not improved.  Was prescribed benzonatate.  Care impacted by chronic illness:Other: Graves'  Care significantly affected by social determinants of health:N/A  Did you consider but not order tests?: Work up considered but not performed and documented in chart, if applicable  Did you interpret images independently?: My preliminary independent interpretation of images are chest x-ray independently interpreted by me reveals opacity in the right lower lobe.  See radiology notes for final report.  Consultation discussion with other provider:Did you involve another provider (consultant, , pharmacy, etc.)?: I discussed the care with another health care provider, see documentation for details.  Discharge. I prescribed additional prescription strength medication(s) as charted. See documentation for any additional details.        0 minutes of critical care time     MEDICATIONS GIVEN IN THE EMERGENCY:  Medications   amoxicillin-clavulanate (AUGMENTIN) 875-125 MG per tablet 1 tablet (1 tablet Oral $Given 10/23/24 1636)   atenolol (TENORMIN) tablet 25 mg (25 mg Oral $Given 10/23/24 1637)       NEW PRESCRIPTIONS STARTED AT TODAY'S ER VISIT  Discharge Medication List as of 10/23/2024  5:41 PM        START taking these medications    Details   amoxicillin-clavulanate (AUGMENTIN) 875-125 MG tablet Take 1 tablet by mouth 2 times daily for 7 days., Disp-14 tablet, R-0, E-Prescribe      doxycycline hyclate (VIBRAMYCIN) 100 MG capsule Take 1 capsule (100 mg) by mouth 2 times daily for 7 days., Disp-14 capsule, R-0, E-Prescribe      methimazole (TAPAZOLE) 10 MG tablet Take 2 tablets (20 mg) by mouth daily., Disp-60 tablet, R-0, E-Prescribe           Discharge Medication List as of 10/23/2024  5:41 PM     "      =================================================================    HPI    Patient information was obtained from: patient   Use of : [employer]  phone     Norberto Onofre is a 59 year old female with a pertinent history of hyperthyroidism who presents to this ED by walk in for evaluation of palpitations for the past few days. Reports similar symptoms when she was having problems with her thyroid. Has seen an endocrinologist and was on methimazole for years but recently stopped taking it because they told her that her \"thyroid levels were fine\". They happen 3-4x a day lasting for several minutes. When they do happen, she feels like she cannot get enough oxygen in. Denies chest pain. Denies fevers at home but feels warm and sweaty and just \"on edge\" the past few days. Also reports feeling intermittently itchy the past few days. Declines new meds, lotions, detergents.    Reports a dry cough x 1 month and sore throat the past few days. Is still tolerating PO but it hurts to swallow. Saw her PCP and declined a CXR that day but prescribed benzonatate. Denies leg swelling, nausea, vomiting, diarrhea, abdominal pain. No hx asthma/COPD. Is not a smoker.     PHYSICAL EXAM    /66   Pulse 55   Temp 97.9  F (36.6  C) (Oral)   Resp 16   Ht 1.524 m (5')   Wt 57.2 kg (126 lb)   SpO2 98%   BMI 24.61 kg/m    Constitutional: Anxious appearing, diaphoretic NAD, GCS 15  HENT: Normocephalic, Atraumatic, Bilateral external ears normal, Oropharynx normal without erythema/exudates, mucous membranes moist, Nose normal. Neck- Normal range of motion, No tenderness, no goiter noted  Eyes: PERRL, EOMI, Conjunctiva normal, No discharge.   Respiratory: Normal breath sounds, No respiratory distress, No wheezing, Speaks full sentences easily. No cough.    Cardiovascular: Normal heart rate, Regular rhythm, No murmurs, No rubs, No gallops.  GI: No excessive obesity. Bowel sounds normal, Soft, No tenderness, No masses, " No flank tenderness. No rebound or guarding.    Musculoskeletal: 2+ DP pulses. No edema. No cyanosis, No clubbing. Good range of motion in all major joints.   Integument: Warm, Dry, No erythema, No rash.    Neurologic: Alert & oriented x 3, Normal motor function, Normal sensory function, No focal deficits noted. Normal gait.    Psychiatric: Affect normal, Judgment normal, anxious appearing. Cooperative.      LAB:  All pertinent labs reviewed and interpreted.  Results for orders placed or performed during the hospital encounter of 10/23/24   XR Chest 2 Views    Impression    IMPRESSION: No pleural fluid or pneumothorax. A new right lower lung opacity on the frontal view is concerning for atypical infection. No edema. Normal size of the heart.   Extra Blue Top Tube   Result Value Ref Range    Hold Specimen JIC    Extra Red Top Tube   Result Value Ref Range    Hold Specimen JIC    Result Value Ref Range    Lipase 84 (H) 13 - 60 U/L   Basic metabolic panel   Result Value Ref Range    Sodium 141 135 - 145 mmol/L    Potassium 3.9 3.4 - 5.3 mmol/L    Chloride 105 98 - 107 mmol/L    Carbon Dioxide (CO2) 29 22 - 29 mmol/L    Anion Gap 7 7 - 15 mmol/L    Urea Nitrogen 18.6 8.0 - 23.0 mg/dL    Creatinine 0.55 0.51 - 0.95 mg/dL    GFR Estimate >90 >60 mL/min/1.73m2    Calcium 9.3 8.8 - 10.4 mg/dL    Glucose 103 (H) 70 - 99 mg/dL   Hepatic function panel   Result Value Ref Range    Protein Total 6.8 6.4 - 8.3 g/dL    Albumin 3.7 3.5 - 5.2 g/dL    Bilirubin Total 0.4 <=1.2 mg/dL    Alkaline Phosphatase 105 40 - 150 U/L    AST 44 0 - 45 U/L     (H) 0 - 50 U/L    Bilirubin Direct <0.20 0.00 - 0.30 mg/dL   Result Value Ref Range    Troponin T, High Sensitivity 30 (H) <=14 ng/L   Result Value Ref Range    Magnesium 1.9 1.7 - 2.3 mg/dL   TSH with free T4 reflex   Result Value Ref Range    TSH <0.01 (L) 0.30 - 4.20 uIU/mL   Symptomatic Influenza A/B, RSV, & SARS-CoV2 PCR (COVID-19) Nasopharyngeal    Specimen: Nasopharyngeal;  Swab   Result Value Ref Range    Influenza A PCR Negative Negative    Influenza B PCR Negative Negative    RSV PCR Negative Negative    SARS CoV2 PCR Negative Negative   CBC with platelets and differential   Result Value Ref Range    WBC Count 8.7 4.0 - 11.0 10e3/uL    RBC Count 4.56 3.80 - 5.20 10e6/uL    Hemoglobin 13.0 11.7 - 15.7 g/dL    Hematocrit 39.9 35.0 - 47.0 %    MCV 88 78 - 100 fL    MCH 28.5 26.5 - 33.0 pg    MCHC 32.6 31.5 - 36.5 g/dL    RDW 11.6 10.0 - 15.0 %    Platelet Count 146 (L) 150 - 450 10e3/uL    % Neutrophils 54 %    % Lymphocytes 36 %    % Monocytes 9 %    % Eosinophils 1 %    % Basophils 0 %    % Immature Granulocytes 1 %    NRBCs per 100 WBC 0 <1 /100    Absolute Neutrophils 4.7 1.6 - 8.3 10e3/uL    Absolute Lymphocytes 3.1 0.8 - 5.3 10e3/uL    Absolute Monocytes 0.8 0.0 - 1.3 10e3/uL    Absolute Eosinophils 0.1 0.0 - 0.7 10e3/uL    Absolute Basophils 0.0 0.0 - 0.2 10e3/uL    Absolute Immature Granulocytes 0.1 <=0.4 10e3/uL    Absolute NRBCs 0.0 10e3/uL   Result Value Ref Range    Troponin T, High Sensitivity 32 (H) <=14 ng/L   Result Value Ref Range    Free T4 5.50 (H) 0.90 - 1.70 ng/dL   T3 total   Result Value Ref Range    T3 Total 328 (H) 85 - 202 ng/dL   Group A Streptococcus PCR Throat Swab    Specimen: Throat; Swab   Result Value Ref Range    Group A strep by PCR Not Detected Not Detected       RADIOLOGY:  Reviewed all pertinent imaging. Please see official radiology report.  XR Chest 2 Views   Final Result   IMPRESSION: No pleural fluid or pneumothorax. A new right lower lung opacity on the frontal view is concerning for atypical infection. No edema. Normal size of the heart.          EKG:    Performed at: 10/23/2024 1149    Impression: Sinus rhythm    Rate: 63    Dr. Rivero independently reviewed and interpreted the EKG(s) documented above.      [facility] EMERGENCY DEPARTMENT  [address] [place] [id number] [phone]         Val Major PA-C  10/23/24 2010

## 2024-10-23 NOTE — ED PROVIDER NOTES
Emergency Department Staff Physician Note     I had a face to face encounter with this patient seen by the Advanced Practice Provider (CYNDI).  I have seen, examined, and discussed the patient with the CYNDI and agree with their assessment and plan of management.    Relevant HPI:     Norberto Onofre is a 59 year old female who presents to the Emergency Department for evaluation of tachycardia and cough.     Patient presents to ED with complaint of tachycardia and cough that has been present for the past 2 weeks. She further endorses shortness of breath and lightheadedness. Patient tried to make an appointment with endocrinology, but there were no appointments available, so she came to the ED.     Patient denies chest pain, nausea, abdominal pain, or any other complaints at this time.    I, Paulino Peña, am serving as a scribe to document services personally performed by Reginald Rivero D.O., based on my observations and the provider's statements to me.   I, Reginald Rivero D.O., attest that Paulino Peña was acting in a scribe capacity, has observed my performance of the services and has documented them in accordance with my direction.    ED Course:  3:21 I received the patient report from the CYNDI, Val Major PA-C. I agree with their assessment and plan of management, and I will see the patient.  3:46 PM I met with the patient to introduce myself, gather additional history, perform my initial exam, and discuss the plan.     Brief Physical Exam:  VITAL SIGNS: /66   Pulse 55   Temp 97.9  F (36.6  C) (Oral)   Resp 16   Ht 1.524 m (5')   Wt 57.2 kg (126 lb)   SpO2 98%   BMI 24.61 kg/m      General Appearance: Well-appearing, well-nourished, no acute distress   Head:  Normocephalic, without obvious abnormality, atraumatic  Eyes:  PERRL, conjunctiva/corneas clear, EOM's intact,  ENT:  Lips, mucosa, and tongue normal, membranes are moist without pallor  Neck:  Normal ROM, symmetrical, trachea midline    Cardio:   Regular rate and rhythm, no murmur, rub or gallop, 2+ pulses symmetric in all extremities  Pulm:  Clear to auscultation bilaterally, respirations unlabored,   Abdomen:  Soft, non-tender, no rebound or guarding.  Musculoskeletal: Full ROM, no edema, no cyanosis, good ROM of major joints  Integument:  Warm, Dry, No erythema, No rash.    Neurologic:  Alert & oriented.  No focal deficits appreciated.  Ambulatory.  Psychiatric:  Affect normal, Judgment normal, Mood normal.        LABS  Results for orders placed or performed during the hospital encounter of 10/23/24 (from the past 24 hours)   Chloe Draw    Narrative    The following orders were created for panel order Chloe Draw.  Procedure                               Abnormality         Status                     ---------                               -----------         ------                     Extra Blue Top Tube[[id number]]                              Final result               Extra Red Top Tube[[id number]]                               Final result               Extra Green Top (Lithium...[[id number]]                                                 Extra Purple Top Tube[[id number]]                                                         Please view results for these tests on the individual orders.   Extra Blue Top Tube   Result Value Ref Range    Hold Specimen JIC    Extra Red Top Tube   Result Value Ref Range    Hold Specimen JIC    CBC with platelets differential    Narrative    The following orders were created for panel order CBC with platelets differential.  Procedure                               Abnormality         Status                     ---------                               -----------         ------                     CBC with platelets and d...[[id number]]  Abnormal            Final result                 Please view results for these tests on the individual orders.   Lipase   Result Value Ref Range    Lipase 84 (H) 13 - 60 U/L   Basic  metabolic panel   Result Value Ref Range    Sodium 141 135 - 145 mmol/L    Potassium 3.9 3.4 - 5.3 mmol/L    Chloride 105 98 - 107 mmol/L    Carbon Dioxide (CO2) 29 22 - 29 mmol/L    Anion Gap 7 7 - 15 mmol/L    Urea Nitrogen 18.6 8.0 - 23.0 mg/dL    Creatinine 0.55 0.51 - 0.95 mg/dL    GFR Estimate >90 >60 mL/min/1.73m2    Calcium 9.3 8.8 - 10.4 mg/dL    Glucose 103 (H) 70 - 99 mg/dL   Hepatic function panel   Result Value Ref Range    Protein Total 6.8 6.4 - 8.3 g/dL    Albumin 3.7 3.5 - 5.2 g/dL    Bilirubin Total 0.4 <=1.2 mg/dL    Alkaline Phosphatase 105 40 - 150 U/L    AST 44 0 - 45 U/L     (H) 0 - 50 U/L    Bilirubin Direct <0.20 0.00 - 0.30 mg/dL   Troponin T, High Sensitivity   Result Value Ref Range    Troponin T, High Sensitivity 30 (H) <=14 ng/L   Magnesium   Result Value Ref Range    Magnesium 1.9 1.7 - 2.3 mg/dL   TSH with free T4 reflex   Result Value Ref Range    TSH <0.01 (L) 0.30 - 4.20 uIU/mL   CBC with platelets and differential   Result Value Ref Range    WBC Count 8.7 4.0 - 11.0 10e3/uL    RBC Count 4.56 3.80 - 5.20 10e6/uL    Hemoglobin 13.0 11.7 - 15.7 g/dL    Hematocrit 39.9 35.0 - 47.0 %    MCV 88 78 - 100 fL    MCH 28.5 26.5 - 33.0 pg    MCHC 32.6 31.5 - 36.5 g/dL    RDW 11.6 10.0 - 15.0 %    Platelet Count 146 (L) 150 - 450 10e3/uL    % Neutrophils 54 %    % Lymphocytes 36 %    % Monocytes 9 %    % Eosinophils 1 %    % Basophils 0 %    % Immature Granulocytes 1 %    NRBCs per 100 WBC 0 <1 /100    Absolute Neutrophils 4.7 1.6 - 8.3 10e3/uL    Absolute Lymphocytes 3.1 0.8 - 5.3 10e3/uL    Absolute Monocytes 0.8 0.0 - 1.3 10e3/uL    Absolute Eosinophils 0.1 0.0 - 0.7 10e3/uL    Absolute Basophils 0.0 0.0 - 0.2 10e3/uL    Absolute Immature Granulocytes 0.1 <=0.4 10e3/uL    Absolute NRBCs 0.0 10e3/uL   T4 free   Result Value Ref Range    Free T4 5.50 (H) 0.90 - 1.70 ng/dL   Symptomatic Influenza A/B, RSV, & SARS-CoV2 PCR (COVID-19) Nasopharyngeal    Specimen: Nasopharyngeal; Swab    Result Value Ref Range    Influenza A PCR Negative Negative    Influenza B PCR Negative Negative    RSV PCR Negative Negative    SARS CoV2 PCR Negative Negative    Narrative    Testing was performed using the Xpert Xpress CoV2/Flu/RSV Assay on the [employer] Instrument. This test should be ordered for the detection of SARS-CoV2, influenza, and RSV viruses in individuals with signs and symptoms of respiratory tract infection. This test is for in vitro diagnostic use under the [place] FDA for laboratories certified under CLIA to perform high or moderate complexity testing. This test has been [place] FDA cleared. A negative result does not rule out the presence of PCR inhibitors in the specimen or target RNA in concentration below the limit of detection for the assay. If only one viral target is positive but coinfection with multiple targets is suspected, the sample should be re-tested with another FDA cleared, approved, or authorized test, if coninfection would change clinical management. This test was validated by the [facility] [employer]. These laboratories are certified under the Clinical Laboratory Improvement Amendments of 1988 (CLIA-88) as qualified to perfom high complexity laboratory testing.   Group A Streptococcus PCR Throat Swab    Specimen: Throat; Swab   Result Value Ref Range    Group A strep by PCR Not Detected Not Detected    Narrative    The Xpert Xpress Strep A test, performed on the [employer]ument Systems, is a rapid, qualitative in vitro diagnostic test for the detection of Streptococcus pyogenes (Group A ß-hemolytic Streptococcus, Strep A) in throat swab specimens from patients with signs and symptoms of pharyngitis. The Xpert Xpress Strep A test can be used as an aid in the diagnosis of Group A Streptococcal pharyngitis. The assay is not intended to monitor treatment for Group A Streptococcus infections. The Xpert Xpress Strep A test utilizes an automated real-time polymerase  chain reaction (PCR) to detect Streptococcus pyogenes DNA.   XR Chest 2 Views    Narrative    EXAM: XR CHEST 2 VIEWS  LOCATION: [facility]  DATE: 10/23/2024    INDICATION: cough, SOB  COMPARISON: 6/2/16      Impression    IMPRESSION: No pleural fluid or pneumothorax. A new right lower lung opacity on the frontal view is concerning for atypical infection. No edema. Normal size of the heart.   Troponin T, High Sensitivity   Result Value Ref Range    Troponin T, High Sensitivity 32 (H) <=14 ng/L         RADIOLOGY  XR Chest 2 Views   Final Result   IMPRESSION: No pleural fluid or pneumothorax. A new right lower lung opacity on the frontal view is concerning for atypical infection. No edema. Normal size of the heart.              Impression / ED Plan:  I had a face to face encounter with this patient seen by the Advanced Practice Provider (CYNDI). I personally made/approved the management plan and take responsibility for the patient management. I personally saw patient and performed a substantive portion of the visit including all aspects of the medical decision making.     Norberto Onofre is a 59 year old female presents to the ED for evaluation of palpitations.  Patient has a known history of hyperthyroidism, was recently discontinued on her methimazole as her endocrinologist believe that her symptoms had improved enough that she no longer needed the medication.  Unfortunately since then she has become more  symptomatic with palpitations as it was when she first started having evidence of hyperthyroidism previously.  On labs today she does show obvious hyperthyroidism, but she is not tachycardic otherwise unstable.  Not showing any signs of cardiac strain or injury.  I consulted with endocrinology, they did recommend we restart her methimazole and have her follow-up as an outpatient.  They did also recommend a one time dose of atenolol here for her symptoms, but not to be discharged on atenolol.   They did not believe the patient required admission.  Therefore we will discharge as recommended by endocrinology on the dosing of medication as they recommended.  Return precautions were discussed.    1. Hyperthyroidism    2. Pneumonia of right lung due to infectious organism, unspecified part of lung        Reginald Rivero D.O.  Emergency Medicine  [facility] EMERGENCY DEPARTMENT  [address] [place] [id number]  [phone]  Dept: [phone]       Reginald Rivero,   10/23/24 2002

## 2024-10-24 ENCOUNTER — TELEPHONE (OUTPATIENT)
Dept: FAMILY MEDICINE | Facility: CLINIC | Age: 59
End: 2024-10-24
Payer: COMMERCIAL

## 2024-10-24 NOTE — RESULT ENCOUNTER NOTE
Resulted after ED visit, routed to PCP, Dr Marc Boles'brandon with hyperthyroidism and started on Methimazole

## 2024-10-24 NOTE — TELEPHONE ENCOUNTER
Message routed to Dr Tran for review / plan    Amaya Magallanes RN  [facility]    ----- Message from Darvin ORTIZ sent at 10/24/2024  2:20 PM CDT -----  Resulted after ED visit, routed to PCP, Dr Tran  Dx'd with hyperthyroidism and started on Methimazole

## 2024-10-25 LAB
ATRIAL RATE - MUSE: 63 BPM
DIASTOLIC BLOOD PRESSURE - MUSE: NORMAL MMHG
INTERPRETATION ECG - MUSE: NORMAL
P AXIS - MUSE: 15 DEGREES
PR INTERVAL - MUSE: 138 MS
QRS DURATION - MUSE: 82 MS
QT - MUSE: 410 MS
QTC - MUSE: 419 MS
R AXIS - MUSE: 32 DEGREES
SYSTOLIC BLOOD PRESSURE - MUSE: NORMAL MMHG
T AXIS - MUSE: 40 DEGREES
VENTRICULAR RATE- MUSE: 63 BPM

## 2024-10-31 RX ORDER — METHIMAZOLE 5 MG/1
TABLET ORAL
Qty: 45 TABLET | Refills: 2 | OUTPATIENT
Start: 2024-10-31

## 2024-10-31 NOTE — TELEPHONE ENCOUNTER
Patient has since been seen and received a new prescription. Refused for duplicate.     Sherin Contreras RN  [facility]

## 2024-12-09 ENCOUNTER — OFFICE VISIT (OUTPATIENT)
Dept: FAMILY MEDICINE | Facility: CLINIC | Age: 59
End: 2024-12-09
Payer: COMMERCIAL

## 2024-12-09 VITALS
HEIGHT: 60 IN | DIASTOLIC BLOOD PRESSURE: 80 MMHG | BODY MASS INDEX: 23.56 KG/M2 | RESPIRATION RATE: 16 BRPM | HEART RATE: 62 BPM | TEMPERATURE: 97.7 F | OXYGEN SATURATION: 96 % | WEIGHT: 120 LBS | SYSTOLIC BLOOD PRESSURE: 124 MMHG

## 2024-12-09 DIAGNOSIS — Z23 ENCOUNTER FOR IMMUNIZATION: ICD-10-CM

## 2024-12-09 DIAGNOSIS — Z71.89 ADVANCED DIRECTIVES, COUNSELING/DISCUSSION: ICD-10-CM

## 2024-12-09 DIAGNOSIS — D69.6 THROMBOCYTOPENIA (H): Primary | ICD-10-CM

## 2024-12-09 DIAGNOSIS — Z00.00 ROUTINE GENERAL MEDICAL EXAMINATION AT A HEALTH CARE FACILITY: ICD-10-CM

## 2024-12-09 DIAGNOSIS — F32.1 CURRENT MODERATE EPISODE OF MAJOR DEPRESSIVE DISORDER WITHOUT PRIOR EPISODE (H): ICD-10-CM

## 2024-12-09 DIAGNOSIS — E55.9 VITAMIN D DEFICIENCY: ICD-10-CM

## 2024-12-09 DIAGNOSIS — H04.123 DRY EYE SYNDROME OF BOTH EYES: ICD-10-CM

## 2024-12-09 DIAGNOSIS — I10 PRIMARY HYPERTENSION: ICD-10-CM

## 2024-12-09 DIAGNOSIS — E05.00 GRAVES DISEASE: ICD-10-CM

## 2024-12-09 DIAGNOSIS — R73.03 PREDIABETES: ICD-10-CM

## 2024-12-09 LAB
ALBUMIN SERPL BCG-MCNC: 4.3 G/DL (ref 3.5–5.2)
ALP SERPL-CCNC: 80 U/L (ref 40–150)
ALT SERPL W P-5'-P-CCNC: 24 U/L (ref 0–50)
ANION GAP SERPL CALCULATED.3IONS-SCNC: 9 MMOL/L (ref 7–15)
AST SERPL W P-5'-P-CCNC: 24 U/L (ref 0–45)
BILIRUB SERPL-MCNC: 0.8 MG/DL
BUN SERPL-MCNC: 13 MG/DL (ref 8–23)
CALCIUM SERPL-MCNC: 10.3 MG/DL (ref 8.8–10.4)
CHLORIDE SERPL-SCNC: 105 MMOL/L (ref 98–107)
CREAT SERPL-MCNC: 0.63 MG/DL (ref 0.51–0.95)
EGFRCR SERPLBLD CKD-EPI 2021: >90 ML/MIN/1.73M2
ERYTHROCYTE [DISTWIDTH] IN BLOOD BY AUTOMATED COUNT: 12.9 % (ref 10–15)
EST. AVERAGE GLUCOSE BLD GHB EST-MCNC: 114 MG/DL
GLUCOSE SERPL-MCNC: 128 MG/DL (ref 70–99)
HBA1C MFR BLD: 5.6 % (ref 0–5.6)
HCO3 SERPL-SCNC: 29 MMOL/L (ref 22–29)
HCT VFR BLD AUTO: 41.4 % (ref 35–47)
HGB BLD-MCNC: 13.9 G/DL (ref 11.7–15.7)
LIPASE SERPL-CCNC: 30 U/L (ref 13–60)
MCH RBC QN AUTO: 28.3 PG (ref 26.5–33)
MCHC RBC AUTO-ENTMCNC: 33.6 G/DL (ref 31.5–36.5)
MCV RBC AUTO: 84 FL (ref 78–100)
PLATELET # BLD AUTO: 138 10E3/UL (ref 150–450)
POTASSIUM SERPL-SCNC: 4.2 MMOL/L (ref 3.4–5.3)
PROT SERPL-MCNC: 7.5 G/DL (ref 6.4–8.3)
RBC # BLD AUTO: 4.92 10E6/UL (ref 3.8–5.2)
SODIUM SERPL-SCNC: 143 MMOL/L (ref 135–145)
T4 FREE SERPL-MCNC: 1.81 NG/DL (ref 0.9–1.7)
TSH SERPL DL<=0.005 MIU/L-ACNC: <0.01 UIU/ML (ref 0.3–4.2)
WBC # BLD AUTO: 7.2 10E3/UL (ref 4–11)

## 2024-12-09 PROCEDURE — 36415 COLL VENOUS BLD VENIPUNCTURE: CPT | Performed by: FAMILY MEDICINE

## 2024-12-09 PROCEDURE — 84443 ASSAY THYROID STIM HORMONE: CPT | Performed by: FAMILY MEDICINE

## 2024-12-09 PROCEDURE — 84439 ASSAY OF FREE THYROXINE: CPT | Performed by: FAMILY MEDICINE

## 2024-12-09 PROCEDURE — 83690 ASSAY OF LIPASE: CPT | Performed by: FAMILY MEDICINE

## 2024-12-09 PROCEDURE — 83036 HEMOGLOBIN GLYCOSYLATED A1C: CPT | Performed by: FAMILY MEDICINE

## 2024-12-09 PROCEDURE — 80053 COMPREHEN METABOLIC PANEL: CPT | Performed by: FAMILY MEDICINE

## 2024-12-09 PROCEDURE — 99000 SPECIMEN HANDLING OFFICE-LAB: CPT | Performed by: FAMILY MEDICINE

## 2024-12-09 PROCEDURE — 85027 COMPLETE CBC AUTOMATED: CPT | Performed by: FAMILY MEDICINE

## 2024-12-09 PROCEDURE — 83520 IMMUNOASSAY QUANT NOS NONAB: CPT | Mod: 90 | Performed by: FAMILY MEDICINE

## 2024-12-09 RX ORDER — CARBOXYMETHYLCELLULOSE SODIUM, GLYCERIN 5; 9 MG/ML; MG/ML
1 SOLUTION/ DROPS OPHTHALMIC 4 TIMES DAILY PRN
Qty: 15 ML | Refills: 11 | Status: SHIPPED | OUTPATIENT
Start: 2024-12-09

## 2024-12-09 RX ORDER — ACETAMINOPHEN 160 MG
2000 TABLET,DISINTEGRATING ORAL DAILY
Qty: 90 CAPSULE | Refills: 4 | Status: SHIPPED | OUTPATIENT
Start: 2024-12-09

## 2024-12-09 RX ORDER — MIRTAZAPINE 15 MG/1
15 TABLET, FILM COATED ORAL
Qty: 90 TABLET | Refills: 1 | Status: SHIPPED | OUTPATIENT
Start: 2024-12-09

## 2024-12-09 SDOH — HEALTH STABILITY: PHYSICAL HEALTH: ON AVERAGE, HOW MANY DAYS PER WEEK DO YOU ENGAGE IN MODERATE TO STRENUOUS EXERCISE (LIKE A BRISK WALK)?: 1 DAY

## 2024-12-09 SDOH — HEALTH STABILITY: PHYSICAL HEALTH: ON AVERAGE, HOW MANY MINUTES DO YOU ENGAGE IN EXERCISE AT THIS LEVEL?: 10 MIN

## 2024-12-09 ASSESSMENT — PATIENT HEALTH QUESTIONNAIRE - PHQ9
SUM OF ALL RESPONSES TO PHQ QUESTIONS 1-9: 17
10. IF YOU CHECKED OFF ANY PROBLEMS, HOW DIFFICULT HAVE THESE PROBLEMS MADE IT FOR YOU TO DO YOUR WORK, TAKE CARE OF THINGS AT HOME, OR GET ALONG WITH OTHER PEOPLE: VERY DIFFICULT
SUM OF ALL RESPONSES TO PHQ QUESTIONS 1-9: 17

## 2024-12-09 ASSESSMENT — SOCIAL DETERMINANTS OF HEALTH (SDOH): HOW OFTEN DO YOU GET TOGETHER WITH FRIENDS OR RELATIVES?: ONCE A WEEK

## 2024-12-09 NOTE — PATIENT INSTRUCTIONS
"Patient Education   Nasra Shad Xeeb Saib Xyuas Kom Tiv Thaiv Tus Kheej  Nov yog ib co nasra shad xeeb uas peb yeej meem muab lissy tib neeg pab lawv noj qab nyob zoo. Trung zaum koj pab pawg saib xyuas yuav muaj ib co nasra shad xeeb uas tshwj xeeb lissy koj nkaus xwb. Thov nrog koj pab pawg saib xyuas sib krystle txog trung yam uas koj toob jere kom tiv thaiv koj tus kheej.  Billy Coj Lub Neej  Es xaws xais ntau geraldo 150 feeb txhua lub chin tiam (30 feeb txhua hnub, 5 hnub ib lub chin tiam).  Ua yam pab cov leeg muaj zog tuaj 2 zaug txhua lub chin tiam. Trung yam no pab koj tswj hwm koj lub cev qhov hnyav thiab tiv thaiv ntawm kab mob.  Txhob haus luam yeeb.  Pleev tshuaj tiv thaiv tshav kub kom thiaj tsis raug mob khees xaws ntawm nqaij tawv.  Txhua 2 mus lissy 5 xyoos yuav tau kuaj koj lub tsev lissy qhov radon. Radon yog ib geraldo jeffery uas tsis muaj xim tsis muaj ntxhiab uas mob tau koj cov ntsws. Kom thiaj kawm ntxiv, mus saib [email or web address] thiab ntaus ntawv \"Radon in Homes.\"  Ceev cov phom kom tsis muaj mos txwv lissy hauv thiab muab xauv [place] hauv ib qho chaw nyab xeeb xws li lub txhoj, los yog muab xauv [place] thiab muab cov yawm sij zais [place]. Muab cov mos txwv xauv lissy hauv lwm qhov chaw nrug cov phom. Kom thiaj kawm ntxiv, mus saib [email or web address] thiab ntaus ntawv \"safe gun storage.\"  Billy Noj Qab Huv  Noj 5 qho txiv hmab txiv ntoo thiab zaub txhua hnub.  Noj nplem nplej, txhuv xim av thiab cov fawm muaj nplej (los theej nplem dawb, txhuv dawb, thiab fawm dawb).  Ua tib zoo noj calcium thiab vitamin D ntau. Saib cov ntawv lo lissy pob khoom noj thiab siv zog noj kom txog 100% RDA (qhov ntau hauv ib hnub).  Yeej meem kuaj ntsuas  Txhua 6 lub hli mus kuaj hniav thiab muab tu.  Txhua xyoo mus saib koj pab pawg saib xyuas billy noj qab nyob zoo kom sib krystle txog:  Ib yam dab tsi uas hloov ntawm koj txoj billy noj qab nyob zoo.  Cov tshuaj uas koj pab pawg tau hais kom siv.  Billy saib xyuas kom tiv thaiv, billy npaj lissy tsev neeg, thiab yuav ua li tricia tiv " thaiv ntawm kab mob uas ntev.  Billy txhaj tshuaj (koob tshuaj tiv thaiv)   Cov koob tshuaj HPV (txog thaum muaj 26 xyoo), yog koj yeej tsis tau txais geraldo li.  Cov koob tshuaj Hepatitis B Kab Mob Siab (txog thaum muaj 59 xyoos), yog koj yeej tsis tau txais geraldo li.  Koob tshuaj COVID-19: Txais koob tshuaj no thaum txog caij txais.  Koob tshuaj tiv thaiv ntawm mob khaub thuas: Txais txhua xyoo.  Koob tshuaj tiv thaiv mob daig tsaig: Txais txhua 10 xyoo.  Pneumococcal, hepatitis A, thiab RSV cov koob tshuaj: Nug koj pab pawg saib xyuas kraig puas tsim nyog lissy koj txais cov no raws li koj qhov pheej hmoo.  Koob tshuaj tiv thaiv ntawm kab mob sawv hlwv (lissy cov muaj 50 xyoo rov macy).  Billy kuaj ntsuas lissy billy noj qab nyob zoo  Kuaj mob ntshav qab zib:  Pib thaum muaj 35 xyoos, Mus kuaj mob ntshav qab zib txhua 3 xyoos los yog ntau zog.  Yog koj tseem tsis tau txog 35 xyoos, nug koj pab pawg saib xyuas kraig puas tsim nyog lissy koj kuaj mob ntshav qab zib.  Kuaj cholesterol: Thaum muaj 39 xyoo, pib kuaj cholesterol txhua 5 xyoos, los yog ntau geraldo ntawd yog kws tiffanie mob qhia.  Kuaj txha qhov tuab (DEXA): Thaum txog 50 xyoo lawd, nug koj pab pawg saib xyuas kraig puas tsim nyog lissy koj kuaj txha kraig puas ruaj.  Kab Mob Siab Hepatitis C: Kuaj ib zaug hauv koj lub neej.  Kuaj Txoj Hlab Ntshav Hauv Plab: Nrog koj tus kws tiffanie mob krystle txog billy kuaj ntsuas no yog koj:  Tau haus luam yeeb ib zaug li; thiab  Yog txiv neej; thiab  Nruab hnub nyoog 65 thiab 75.  Mob Jere Cees (kis mob dhau ntawm billy sib deev)  Ua ntej muaj 24 xyoos: Nug koj pab pawg saib xyuas kraig puas tsim nyog kuaj lissy mob jere cees.  De qab muaj 24 xyoos: Mus kuaj lissy mob jere cees yog koj muaj billy pheej hmoo. Koj muaj billy pheej hmoo lissy mob jere cees (suav nrog HIV) yog:  Koj sib deev nrog ntau geraldo ib tug neeg.  Koj tsis siv cov hnab looj qau thaum sib deev.  Koj los yog koj tus khub deev kuaj pom tias muaj mob jere cees lawm.  Yog koj muaj billy pheej hmoo lissy mob jere cees  HIV, nug txog cov tshuaj PrEP kom tiv thaiv ntawm HIV.  Mus kuaj lissy mob jere cees HIV yam ntau geraldo ib zaug hauv koj lub neej, txawm yog koj muaj billy pheej hmoo lissy mob jere cees HIV los tsis muaj los xij.  Kuaj ilssy mob khees xaws  Kuaj lub ncauj tsev me nyuam lissy mob khees xaws: Yog koj muaj ib lub ncauj tsev me nyuam, emery yuav tau ib sij kuaj lub ncauj tsev me nyuam seb puas muaj mob khees xaws pib thaum muaj 21 xyoos. Neeg feem coob uas ib sij kuaj lub ncauj tsev me nyuam thiab tsis pom dab tsi txawv lawv tsum tau ella qab muaj 65 xyoos. Nrog koj tus kws tiffanie mob sib krystle txog qhov no.  Kuaj lub mis lissy mob khees xaws (mammogram): Yog koj tau muaj mis geraldo li, yuav tau ib sij kuaj lub mis pib thaum muaj 40 xyoo. Billy kuaj no yog kom saib seb puas muaj mob khees xaws hauv lub mis.  Kuaj Txoj Hnyuv Lissy Mob Khees Xaws: Yeej tseem ceeb pib kuaj txoj hnyuv lissy mob khees xaws pib thaum muaj 45 xyoos.  Txhua 10 xyoo yuav tau kuaj txoj hnyuv (los yog ntau zog yog koj muaj billy pheej hmoo) Los sis, nug koj tus kws tiffanie mob txog billy kuaj thooj quav FIT txhua xyoo los yog Cologuard txhua 3 xyoos.  Kom thiaj kawm ntxiv txog trung geraldo kuaj no, mus saib: [email or web address].  Yog xav tau billy pab txog qhov no, mus saib: [email or web address].  Kuaj lub barbara kua phev (prostate) lissy mob khees xaws: Yog koj muaj ib lub barbara kua phev (prostate) thiab nruab hnub nyoog 55 mus lissy 69, nug koj tus kws tiffanie mob kraig puas tsim nyog lissy koj kuaj lub barbara kua phev.  Kuaj ntsws lissy mob khees xaws: Yog koj haus luam yeeb garcia sim no los yog tau haus yav tas los uas muaj hnub nyoog nruab 50 xyoos mus lissy 80 xyoo, nug koj pab pawg saib xyuas kraig puas tsim nyog lissy koj yeej meem kuaj lub ntsws lissy mob khees xaws.    Lissy cov geraldo phiaj billy siv ua ntaub ntawv qhia nkaus xwb. Yuav tsis pauv billy qhia los ntawm koj qhov chaw tiffanie mob. Copyright (madie mcfadden)   2023 [facility].   Txhua txoj lesa raug tswj tseg lawm. Tshaj xyuas los ntawm [facility]  [place] [facility]. [employer] [id number] - REV 04/24.  Preventing Falls: Care Instructions  Injuries and health problems such as trouble walking or poor eyesight can increase your risk of falling. So can some medicines. But there are things you can do to help prevent falls. You can exercise to get stronger. You can also arrange your home to make it safer.    Talk to your doctor about the medicines you take. Ask if any of them increase the risk of falls and whether they can be changed or stopped.   Try to exercise regularly. It can help improve your strength and balance. This can help lower your risk of falling.         Practice fall safety and prevention.   Wear low-heeled shoes that fit well and give your feet good support. Talk to your doctor if you have foot problems that make this hard.  Carry a cellphone or wear a medical alert device that you can use to call for help.  Use stepladders instead of chairs to reach high objects. Don't climb if you're at risk for falls. Ask for help, if needed.  Wear the correct eyeglasses, if you need them.        Make your home safer.   Remove rugs, cords, clutter, and furniture from walkways.  Keep your house well lit. Use night-lights in hallways and bathrooms.  Install and use sturdy handrails on stairways.  Wear nonskid footwear, even inside. Don't walk barefoot or in socks without shoes.        Be safe outside.   Use handrails, curb cuts, and ramps whenever possible.  Keep your hands free by using a shoulder bag or backpack.  Try to walk in well-lit areas. Watch out for uneven ground, changes in pavement, and debris.  Be careful in the winter. Walk on the grass or gravel when sidewalks are slippery. Use de-icer on steps and walkways. Add non-slip devices to shoes.    Put grab bars and nonskid mats in your shower or tub and near the toilet. Try to use a shower chair or bath bench when bathing.   Get into a tub or shower by putting in your weaker leg first. Get out  "with your strong side first. Have a phone or medical alert device in the bathroom with you.   Where can you learn more?  Go to https://www.[employer].net/patiented  Enter G117 in the search box to learn more about \"Preventing Falls: Care Instructions.\"  Current as of: July 17, 2023  Content Version: 14.2 2024 [employer].   Care instructions adapted under license by your healthcare professional. If you have questions about a medical condition or this instruction, always ask your healthcare professional. [employer] disclaims any warranty or liability for your use of this information.    Learning About Stress  What is stress?     Stress is your body's response to a hard situation. Your body can have a physical, emotional, or mental response. Stress is a fact of life for most people, and it affects everyone differently. What causes stress for you may not be stressful for someone else.  A lot of things can cause stress. You may feel stress when you go on a job interview, take a test, or run a race. This kind of short-term stress is normal and even useful. It can help you if you need to work hard or react quickly. For example, stress can help you finish an important job on time.  Long-term stress is caused by ongoing stressful situations or events. Examples of long-term stress include long-term health problems, ongoing problems at work, or conflicts in your family. Long-term stress can harm your health.  How does stress affect your health?  When you are stressed, your body responds as though you are in danger. It makes hormones that speed up your heart, make you breathe faster, and give you a burst of energy. This is called the fight-or-flight stress response. If the stress is over quickly, your body goes back to normal and no harm is done.  But if stress happens too often or lasts too long, it can have bad effects. Long-term stress can make you more likely to get sick, and it can make " symptoms of some diseases worse. If you tense up when you are stressed, you may develop neck, shoulder, or low back pain. Stress is linked to high blood pressure and heart disease.  Stress also harms your emotional health. It can make you tinajero, tense, or depressed. Your relationships may suffer, and you may not do well at work or school.  What can you do to manage stress?  You can try these things to help manage stress:   Do something active. Exercise or activity can help reduce stress. Walking is a great way to get started. Even everyday activities such as housecleaning or yard work can help.  Try yoga or jairo chi. These techniques combine exercise and meditation. You may need some training at first to learn them.  Do something you enjoy. For example, listen to music or go to a movie. Practice your hobby or do volunteer work.  Meditate. This can help you relax, because you are not worrying about what happened before or what may happen in the future.  Do guided imagery. Imagine yourself in any setting that helps you feel calm. You can use online videos, books, or a teacher to guide you.  Do breathing exercises. For example:  From a standing position, bend forward from the waist with your knees slightly bent. Let your arms dangle close to the floor.  Breathe in slowly and deeply as you return to a standing position. Roll up slowly and lift your head last.  Hold your breath for just a few seconds in the standing position.  Breathe out slowly and bend forward from the waist.  Let your feelings out. Talk, laugh, cry, and express anger when you need to. Talking with supportive friends or family, a counselor, or a brennan leader about your feelings is a healthy way to relieve stress. Avoid discussing your feelings with people who make you feel worse.  Write. It may help to write about things that are bothering you. This helps you find out how much stress you feel and what is causing it. When you know this, you can find  "better ways to cope.  What can you do to prevent stress?  You might try some of these things to help prevent stress:  Manage your time. This helps you find time to do the things you want and need to do.  Get enough sleep. Your body recovers from the stresses of the day while you are sleeping.  Get support. Your family, friends, and community can make a difference in how you experience stress.  Limit your news feed. Avoid or limit time on social media or news that may make you feel stressed.  Do something active. Exercise or activity can help reduce stress. Walking is a great way to get started.  Where can you learn more?  Go to https://www.[employer].net/patiented  Enter N032 in the search box to learn more about \"Learning About Stress.\"  Current as of: October 24, 2023  Content Version: 14.2 2024 [facility].   Care instructions adapted under license by your healthcare professional. If you have questions about a medical condition or this instruction, always ask your healthcare professional. [employer] disclaims any warranty or liability for your use of this information.    Learning About Depression Screening  What is depression screening?  Depression screening is a way to see if you have depression symptoms. It may be done by a doctor or counselor. It's often part of a routine checkup. That's because your mental health is just as important as your physical health.  Depression is a mental health condition that affects how you feel, think, and act. You may:  Have less energy.  Lose interest in your daily activities.  Feel sad and grouchy for a long time.  Depression is very common. It affects people of all ages.  Many things can lead to depression. Some people become depressed after they have a stroke or find out they have a major illness like cancer or heart disease. The death of a loved one or a breakup may lead to depression. It can run in families. Most experts believe that a " "combination of inherited genes and stressful life events can cause it.  What happens during screening?  You may be asked to fill out a form about your depression symptoms. You and the doctor will discuss your answers. The doctor may ask you more questions to learn more about how you think, act, and feel.  What happens after screening?  If you have symptoms of depression, your doctor will talk to you about your options.  Doctors usually treat depression with medicines or counseling. Often, combining the two works best. Many people don't get help because they think that they'll get over the depression on their own. But people with depression may not get better unless they get treatment.  The cause of depression is not well understood. There may be many factors involved. But if you have depression, it's not your fault.  A serious symptom of depression is thinking about death or suicide. If you or someone you care about talks about this or about feeling hopeless, get help right away.  It's important to know that depression can be treated. Medicine, counseling, and self-care may help.  Where can you learn more?  Go to https://www.[employer].net/patiented  Enter T185 in the search box to learn more about \"Learning About Depression Screening.\"  Current as of: June 24, 2023  Content Version: 14.2 2024 [facility].   Care instructions adapted under license by your healthcare professional. If you have questions about a medical condition or this instruction, always ask your healthcare professional. Healthwise[employer] Incorporated disclaims any warranty or liability for your use of this information.       "

## 2024-12-09 NOTE — PROGRESS NOTES
Preventive Care Visit  [facility]  Nava Tran MD, Family Medicine  Dec 9, 2024  {Provider  Link to [facility] :[id number]}    Assessment & Plan     Thrombocytopenia (H)  ***  - CBC with platelets    Primary hypertension  ***  - Comprehensive metabolic panel (BMP + Alb, Alk Phos, ALT, AST, Total. Bili, TP)    Prediabetes  ***  - Comprehensive metabolic panel (BMP + Alb, Alk Phos, ALT, AST, Total. Bili, TP)  - Hemoglobin A1c    Graves disease  ***  - TSH with free T4 reflex  - Comprehensive metabolic panel (BMP + Alb, Alk Phos, ALT, AST, Total. Bili, TP)  - Thyrotropin Receptor Antibody  - Lipase    Encounter for immunization  ***    Routine general medical examination at a health care facility  ***    Advanced directives, counseling/discussion  ***    Vitamin D deficiency  ***  - Cholecalciferol (VITAMIN D3) 50 MCG (2000 UT) CAPS  Dispense: 90 capsule; Refill: 4    Current moderate episode of major depressive disorder without prior episode (H)  ***  - mirtazapine (REMERON) 15 MG tablet  Dispense: 90 tablet; Refill: 1    Dry eye syndrome of both eyes  ***  - REFRESH RELIEVA 0.5-0.9 % ophthalmic solution  Dispense: 15 mL; Refill: 11      Patient has been advised of split billing requirements and indicates understanding: Yes        Counseling  Appropriate preventive services were addressed with this patient via screening, questionnaire, or discussion as appropriate for fall prevention, nutrition, physical activity, Tobacco-use cessation, social engagement, weight loss and cognition.  Checklist reviewing preventive services available has been given to the patient.  Reviewed patient's diet, addressing concerns and/or questions.   She is at risk for lack of exercise and has been provided with information to increase physical activity for the benefit of her well-being.   The patient was instructed to see the dentist every 6 months.   She is at risk for psychosocial distress and has been provided with  information to reduce risk.   The patient's PHQ-9 score is consistent with moderate depression. She was provided with information regarding depression.       Natalia Gunter is a 59 year old, presenting for the following:  Physical and Thyroid check        12/9/2024     8:26 AM   Additional Questions   Roomed by Jcarlos HENRIQUEZ   Accompanied by son          HPI  H/o Graves disease with hypothyroid managed with methimazole but took herself off and thyroid levels remained stable for months.  Presented to Ed this fall with vague symptoms- tachycardia.  Found to be hyperthyroid again and started on meds.  Reviewed labs and chart.  Started on methimazole 20mg daily for 2-3 weeks and then cut back to 10mg daily  - had been on 7.5mg daily previously.  Breathing and heart are feeling better. Cough is better after treatment for atypical infection on imaging in ED 10/2024 [facility].  No more vaginal bleeding.      Seen for postmenopausal bleeding 10/2024- reviewed chart.  Pt declined endometrial bx.  No menstrual cycle for 2 years and then got a menstrual cycle- bled for 7 days early 10/2024 but nothing since then.     Bp- did get bp med and took for 1 month but bp got really low so she stopped taking this medication. Spb 120s with clinic visits.      Mental health- having a lot of depression.    Did  duloxetine for 90 days and remerone 30mg 90 days and states she still has some left of both.  Only taking remeron prn. [facility] for [facility] - [facility].  Possibly therapy?     Has upcoming eye surgery and needs a preop -     Colon cancer screening- colonoscopy ordered     Prediabetes     Vitamin D- taking supplement daily for 90 days but didn't  refills.      Declines covid, adacel, shingrix vaccine     Health Care Directive  Patient does not have a Health Care Directive: Discussed advance care planning with patient; information given to patient to review.      12/9/2024   General  Health   How would you rate your overall physical health? Good   Feel stress (tense, anxious, or unable to sleep) Very much      (!) STRESS CONCERN      12/9/2024   Nutrition   Three or more servings of calcium each day? Yes   Diet: Regular (no restrictions)   How many servings of fruit and vegetables per day? (!) 2-3   How many sweetened beverages each day? 0-1            12/9/2024   Exercise   Days per week of moderate/strenous exercise 1 day   Average minutes spent exercising at this level 10 min      (!) EXERCISE CONCERN      12/9/2024   Social Factors   Frequency of gathering with friends or relatives Once a week   Worry food won't last until get money to buy more Yes   Food not last or not have enough money for food? Yes   Do you have housing? (Housing is defined as stable permanent housing and does not include staying ouside in a car, in a tent, in an abandoned building, in an overnight shelter, or couch-surfing.) Yes   Are you worried about losing your housing? No   Lack of transportation? No   Unable to get utilities (heat,electricity)? No      (!) FOOD SECURITY CONCERN PRESENT      12/9/2024   Fall Risk   Fallen 2 or more times in the past year? Yes    Trouble with walking or balance? Yes    Gait Speed Test (Document in seconds) 5   Gait Speed Test Interpretation Less than or equal to 5.00 seconds - PASS       Patient-reported          12/9/2024   Dental   Dentist two times every year? (!) NO             Today's PHQ-9 Score:       12/9/2024     8:31 AM   PHQ-9 SCORE   PHQ-9 Total Score MyChart 17 (Moderately severe depression)   PHQ-9 Total Score 17        Patient-reported         12/9/2024   Substance Use   Alcohol more than 3/day or more than 7/wk Not Applicable   Do you use any other substances recreationally? No        Social History     Tobacco Use    Smoking status: Never     Passive exposure: Never    Smokeless tobacco: Never   Vaping Use    Vaping status: Never Used   Substance Use Topics     Alcohol use: Yes     Alcohol/week: 1.0 standard drink of alcohol     Types: 1 Cans of beer per week    Drug use: Not Currently     {Provider  If there are gaps in the social history shown above, please follow the link to update and then refresh the note Link to Social and Substance History :[id number]}      8/16/2023   LAST FHS-7 RESULTS   1st degree relative breast or ovarian cancer No    No   Any relative bilateral breast cancer No    No   Any male have breast cancer No    No   Any ONE woman have BOTH breast AND ovarian cancer No    No   Any woman with breast cancer before 50yrs No    No   2 or more relatives with breast AND/OR ovarian cancer No    No   2 or more relatives with breast AND/OR bowel cancer No    No       Multiple values from one day are sorted in reverse-chronological order     {If any of the questions to the FHS7 are answered yes, consider referral for genetic counseling.    Additional indications for genetic referral include personal history of breast or ovarian cancer, genetic mutation in 1st degree relative which increases risk of breast cancer including BRCA1, BRCA2, NOLA, PALB 2, TP53, CHEK2, PTEN, CDH1, STK11 (per ACS) and/or 1st degree relative with history of pancreatic or high-risk prostate cancer (per NCCN):036400}   Mammogram Screening - Mammogram every 1-2 years updated in Health Maintenance based on mutual decision making        12/9/2024   STI Screening   New sexual partner(s) since last STI/HIV test? No        History of abnormal Pap smear: No - age 30- 64 PAP with HPV every 5 years recommended        Latest Ref Rng & Units 7/13/2023     3:32 PM   PAP / HPV   PAP  Negative for Intraepithelial Lesion or Malignancy (NILM)    HPV 16 DNA Negative Negative    HPV 18 DNA Negative Negative    Other HR HPV Negative Negative      ASCVD Risk   The 10-year ASCVD risk score (Quan ALVARADO, et al., 2019) is: 3.9%    Values used to calculate the score:      Age: 59 years      Sex: Female      Is  "Non- : No      Diabetic: No      Tobacco smoker: No      Systolic Blood Pressure: 124 mmHg      Is BP treated: Yes      HDL Cholesterol: 50 mg/dL      Total Cholesterol: 190 mg/dL      {Provider  REQUIRED FOR AWV Use the storyboard to review patient history, after sections have been marked as reviewed, refresh note to capture documentation:[id number]}   Reviewed and updated as needed this visit by Provider   Tobacco  Allergies  Meds  Problems  Med Hx  Surg Hx  Fam Hx  Soc   Hx Sexual Activity                   Objective    Exam  /80 (BP Location: Right arm, Patient Position: Sitting, Cuff Size: Adult Regular)   Pulse 62   Temp 97.7  F (36.5  C) (Oral)   Resp 16   Ht 1.525 m (5' 0.04\")   Wt 54.4 kg (120 lb)   SpO2 96%   BMI 23.41 kg/m     Estimated body mass index is 23.41 kg/m  as calculated from the following:    Height as of this encounter: 1.525 m (5' 0.04\").    Weight as of this encounter: 54.4 kg (120 lb).    Physical Exam  Complete 10 point ROS completed today as part of the exam and patient denies any symptoms as reviewed in HPI     Wt Readings from Last 3 Encounters:   12/09/24 54.4 kg (120 lb)   10/23/24 57.2 kg (126 lb)   10/14/24 54.9 kg (121 lb)       No LMP recorded. Patient is premenopausal.    All normal as below except abnormalities include: ***  General is a  59 year old sitting comfortably in no apparent distress   HEENT:  TM are clear bilaterally.  Eye exams within normal   Neck: Supple without lymphadenopathy or thyromegally  CV: Regular rate and rhythm S1S2 without rubs, murmurs or gallops,   Lungs: Clear to auscultation bilaterally  Abd:  +BS, soft NT/ND,  No masses or organomegally,   Extremities: Warm, No Edema, 2+ Pedal and radial pulses bilaterally  Skin: No lesions or rashes noted  Neuro: Able to ambulate around the exam room with equal movement, strength and normal coordination of the upper and lower extremeties symmetrically    History summarized " from1-2:as above   Old Records-1: Outside allergies, meds, problems and immunizations were reconciled as needed from CareEverywhere  Radiology tests reviewed-1: CXR 2024   Lab tests reviewed-1: 2024  Medicine tests reviewed-1: EKG 2024      Follow-up Visit   Expected date:  Dec 09, 2025 (Approximate)      Follow Up Appointment Details:     Follow-up with whom?: PCP    Follow-Up for what?: Adult Preventive    How?: In Person                 Nava Tran MD         Signed Electronically by: Nava Tran MD  {Email feedback regarding this note to [email or web address]   :[id number]}    Prior to immunization administration, verified patients identity using patient s name and date of birth. Please see Immunization Activity for additional information.     Screening Questionnaire for Adult Immunization    Are you sick today?   No   Do you have allergies to medications, food, a vaccine component or latex?   No   Have you ever had a serious reaction after receiving a vaccination?   No   Do you have a long-term health problem with heart, lung, kidney, or metabolic disease (e.g., diabetes), asthma, a blood disorder, no spleen, complement component deficiency, a cochlear implant, or a spinal fluid leak?  Are you on long-term aspirin therapy?   No   Do you have cancer, leukemia, HIV/AIDS, or any other immune system problem?   No   Do you have a parent, brother, or sister with an immune system problem?   No   In the past 3 months, have you taken medications that affect  your immune system, such as prednisone, other steroids, or anticancer drugs; drugs for the treatment of rheumatoid arthritis, Crohn s disease, or psoriasis; or have you had radiation treatments?   No   Have you had a seizure, or a brain or other nervous system problem?   No   During the past year, have you received a transfusion of blood or blood    products, or been given immune (gamma) globulin or antiviral drug?   No   For women: Are  you pregnant or is there a chance you could become       pregnant during the next month?   No   Have you received any vaccinations in the past 4 weeks?   No     Immunization questionnaire answers were all negative.      Patient instructed to remain in clinic for 15 minutes afterwards, and to report any adverse reactions.     Screening performed by Jcarlos Felton MA on 12/9/2024 at 8:38 AM.        .undefined[^^  Answers submitted by the patient for this visit:  Patient Health Questionnaire (Submitted on 12/9/2024)  If you checked off any problems, how difficult have these problems made it for you to do your work, take care of things at home, or get along with other people?: Very difficult  PHQ9 TOTAL SCORE: 17

## 2024-12-10 ENCOUNTER — APPOINTMENT (OUTPATIENT)
Dept: INTERPRETER SERVICES | Facility: CLINIC | Age: 59
End: 2024-12-10
Payer: COMMERCIAL

## 2024-12-10 ENCOUNTER — TELEPHONE (OUTPATIENT)
Dept: FAMILY MEDICINE | Facility: CLINIC | Age: 59
End: 2024-12-10
Payer: COMMERCIAL

## 2024-12-10 LAB — TSH RECEP AB SER-ACNC: 7.74 IU/L (ref 0–1.75)

## 2024-12-10 RX ORDER — METHIMAZOLE 10 MG/1
20 TABLET ORAL DAILY
Qty: 60 TABLET | Refills: 2 | Status: SHIPPED | OUTPATIENT
Start: 2024-12-10 | End: 2024-12-10

## 2024-12-10 RX ORDER — METHIMAZOLE 10 MG/1
15 TABLET ORAL DAILY
Qty: 45 TABLET | Refills: 2 | Status: SHIPPED | OUTPATIENT
Start: 2024-12-10

## 2024-12-10 NOTE — TELEPHONE ENCOUNTER
Nava Tran MD  [facility] - Primary Care  Team - please call patient with results and send result letter with this information if patient desires for their records. [facility] to [facility] as needed.      Her thyroid is still over active- she should increase her thyroid pill, methimazole, to 1 1/2 pills a day (15mg) and recheck her labs in 6-8 weeks.  Orders placed.  Please schedule for lab only.      Healthy kidney and liver tests    Blood counts are stable- no anemia    Normal sugars- no diabetes

## 2024-12-10 NOTE — RESULT ENCOUNTER NOTE
Team - please call patient with results and send result letter with this information if patient desires for their records. [facility] as needed.      Her thyroid is still over active- she should increase her thyroid pill, methimazole, to 1 1/2 pills a day (15mg) and recheck her labs in 6-8 weeks.  Orders placed.  Please schedule for lab only.      Healthy kidney and liver tests    Blood counts are stable- no anemia     Normal sugars- no diabetes

## 2024-12-13 NOTE — TELEPHONE ENCOUNTER
Attempt #1. Called with a Hmong . No answer. LVM to call clinic and ask to speak to a triage nurse. Please relayed clinician's message below upon return call.     Bud KING RN  [facility] Primary [facility]

## 2024-12-17 ENCOUNTER — APPOINTMENT (OUTPATIENT)
Dept: INTERPRETER SERVICES | Facility: CLINIC | Age: 59
End: 2024-12-17
Payer: COMMERCIAL

## 2024-12-17 NOTE — TELEPHONE ENCOUNTER
RN called and relayed message below from Dr. Tran. Patient verbalized understanding and in agreement with plan.     Future Appointments 12/17/2024 - 6/15/2025        Date Visit Type Length Department Provider     12/24/2024 11:45 AM MA SCREENING BILATERAL W/ ALEIDA 30 min [id number]    Location Instructions:     Effective March 12th, 2018 we moved to the [facility] located at [address], [address], [place], [place]. Our building is located across the street from [facility] and offers free parking in our on-site lot. Please take the stairs or elevator to the third floor [address] to the [facility] and check in at the .  This appointment is in a hospital-based location.&nbsp; Before your visit, you may want to check with your insurance company for coverage and referral options, including cost differences between services provided in different clinic settings.&nbsp; For more information visit this link on the "[employer] [place] Website:&nbsp; [email or web address]              1/31/2025  9:30 AM LAB 15 min [facility]    Location Instructions:     We are located at [address] [place] [address]. We offer free parking in the lot on [address] across the street from the clinic. Please check in at the  through the main entrance.              1/31/2025 10:00 AM NEW - PRE-OPERATIVE 40 min [facility]/Nava Palmer MD    Location Instructions:     [facility] is located at [address] [place] [address] in [place], at the intersection of [address]. This is one block south of the [facility]. Free parking is available in the lot directly north of the clinic across [address]. The clinic is near stops along bus [address].                    Bud KING RN  [facility]

## 2024-12-26 ENCOUNTER — PATIENT OUTREACH (OUTPATIENT)
Dept: CARE COORDINATION | Facility: CLINIC | Age: 59
End: 2024-12-26
Payer: COMMERCIAL

## 2025-01-31 ENCOUNTER — OFFICE VISIT (OUTPATIENT)
Dept: FAMILY MEDICINE | Facility: CLINIC | Age: 60
End: 2025-01-31
Payer: COMMERCIAL

## 2025-01-31 VITALS
DIASTOLIC BLOOD PRESSURE: 108 MMHG | TEMPERATURE: 97.3 F | SYSTOLIC BLOOD PRESSURE: 180 MMHG | HEART RATE: 60 BPM | WEIGHT: 125 LBS | HEIGHT: 60 IN | RESPIRATION RATE: 16 BRPM | OXYGEN SATURATION: 100 % | BODY MASS INDEX: 24.54 KG/M2

## 2025-01-31 DIAGNOSIS — D69.6 THROMBOCYTOPENIA: ICD-10-CM

## 2025-01-31 DIAGNOSIS — F32.1 CURRENT MODERATE EPISODE OF MAJOR DEPRESSIVE DISORDER WITHOUT PRIOR EPISODE (H): ICD-10-CM

## 2025-01-31 DIAGNOSIS — I10 PRIMARY HYPERTENSION: ICD-10-CM

## 2025-01-31 DIAGNOSIS — Z01.818 PREOP GENERAL PHYSICAL EXAM: Primary | ICD-10-CM

## 2025-01-31 DIAGNOSIS — E05.00 GRAVES DISEASE: ICD-10-CM

## 2025-01-31 LAB
ALBUMIN SERPL BCG-MCNC: 4.2 G/DL (ref 3.5–5.2)
ALP SERPL-CCNC: 79 U/L (ref 40–150)
ALT SERPL W P-5'-P-CCNC: 18 U/L (ref 0–50)
ANION GAP SERPL CALCULATED.3IONS-SCNC: 9 MMOL/L (ref 7–15)
AST SERPL W P-5'-P-CCNC: 21 U/L (ref 0–45)
BILIRUB SERPL-MCNC: 0.7 MG/DL
BUN SERPL-MCNC: 10.9 MG/DL (ref 8–23)
CALCIUM SERPL-MCNC: 10.2 MG/DL (ref 8.8–10.4)
CHLORIDE SERPL-SCNC: 105 MMOL/L (ref 98–107)
CREAT SERPL-MCNC: 0.81 MG/DL (ref 0.51–0.95)
EGFRCR SERPLBLD CKD-EPI 2021: 83 ML/MIN/1.73M2
ERYTHROCYTE [DISTWIDTH] IN BLOOD BY AUTOMATED COUNT: 13.4 % (ref 10–15)
GLUCOSE SERPL-MCNC: 136 MG/DL (ref 70–99)
HCO3 SERPL-SCNC: 28 MMOL/L (ref 22–29)
HCT VFR BLD AUTO: 40.8 % (ref 35–47)
HGB BLD-MCNC: 13.8 G/DL (ref 11.7–15.7)
MCH RBC QN AUTO: 28.7 PG (ref 26.5–33)
MCHC RBC AUTO-ENTMCNC: 33.8 G/DL (ref 31.5–36.5)
MCV RBC AUTO: 85 FL (ref 78–100)
PLATELET # BLD AUTO: 136 10E3/UL (ref 150–450)
POTASSIUM SERPL-SCNC: 3.8 MMOL/L (ref 3.4–5.3)
PROT SERPL-MCNC: 7.3 G/DL (ref 6.4–8.3)
RBC # BLD AUTO: 4.81 10E6/UL (ref 3.8–5.2)
SODIUM SERPL-SCNC: 142 MMOL/L (ref 135–145)
WBC # BLD AUTO: 5 10E3/UL (ref 4–11)

## 2025-01-31 PROCEDURE — 80053 COMPREHEN METABOLIC PANEL: CPT | Performed by: FAMILY MEDICINE

## 2025-01-31 PROCEDURE — 85027 COMPLETE CBC AUTOMATED: CPT | Performed by: FAMILY MEDICINE

## 2025-01-31 PROCEDURE — 99214 OFFICE O/P EST MOD 30 MIN: CPT | Performed by: FAMILY MEDICINE

## 2025-01-31 RX ORDER — LOSARTAN POTASSIUM AND HYDROCHLOROTHIAZIDE 12.5; 5 MG/1; MG/1
1 TABLET ORAL DAILY
Qty: 90 TABLET | Refills: 1 | Status: SHIPPED | OUTPATIENT
Start: 2025-01-31

## 2025-01-31 ASSESSMENT — PATIENT HEALTH QUESTIONNAIRE - PHQ9: SUM OF ALL RESPONSES TO PHQ QUESTIONS 1-9: 6

## 2025-01-31 NOTE — PROGRESS NOTES
Prior to immunization administration, verified patients identity using patient s name and date of birth. Please see Immunization Activity for additional information.     Screening Questionnaire for Adult Immunization    Are you sick today?   No   Do you have allergies to medications, food, a vaccine component or latex?   No   Have you ever had a serious reaction after receiving a vaccination?   No   Do you have a long-term health problem with heart, lung, kidney, or metabolic disease (e.g., diabetes), asthma, a blood disorder, no spleen, complement component deficiency, a cochlear implant, or a spinal fluid leak?  Are you on long-term aspirin therapy?   No   Do you have cancer, leukemia, HIV/AIDS, or any other immune system problem?   No   Do you have a parent, brother, or sister with an immune system problem?   No   In the past 3 months, have you taken medications that affect  your immune system, such as prednisone, other steroids, or anticancer drugs; drugs for the treatment of rheumatoid arthritis, Crohn s disease, or psoriasis; or have you had radiation treatments?   No   Have you had a seizure, or a brain or other nervous system problem?   No   During the past year, have you received a transfusion of blood or blood    products, or been given immune (gamma) globulin or antiviral drug?   No   For women: Are you pregnant or is there a chance you could become       pregnant during the next month?   No   Have you received any vaccinations in the past 4 weeks?   No     Immunization questionnaire       Patient instructed to remain in clinic for 15 minutes afterwards, and to report any adverse reactions.     Screening performed by Юлия Jennings MA on 1/31/2025 at 9:47 AM.   Answers submitted by the patient for this visit:  Patient Health Questionnaire (Submitted on 1/31/2025)  PHQ9 TOTAL SCORE: Incomplete

## 2025-01-31 NOTE — PATIENT INSTRUCTIONS
How to Take Your Medication Before Surgery  Preoperative Medication Instructions   Antiplatelet or Anticoagulation Medication Instructions   - Patient is on no antiplatelet or anticoagulation medications.    Additional Medication Instructions  Take all scheduled medications on the day of surgery       Patient Education   Preparing for Your Surgery  For Adults  Getting started  In most cases, a nurse will call to review your health history and instructions. They will give you an arrival time based on your scheduled surgery time. Please be ready to share:  Your doctor's clinic name and phone number  Your medical, surgical, and anesthesia history  A list of allergies and sensitivities  A list of medicines, including herbal treatments and over-the-counter drugs  Whether the patient has a legal guardian (ask how to send us the papers in advance)  Note: You may not receive a call if you were seen at our PAC (Preoperative Assessment Center).  Please tell us if you're pregnant--or if there's any chance you might be pregnant. Some surgeries may injure a fetus (unborn baby), so they require a pregnancy test. Surgeries that are safe for a fetus don't always need a test, and you can choose whether to have one.   Preparing for surgery  Within 10 to 30 days of surgery: Have a pre-op exam (sometimes called an H&P, or History and Physical). This can be done at a clinic or pre-operative center.  If you're having a , you may not need this exam. Talk to your care team.  At your pre-op exam, talk to your care team about all medicines you take. (This includes CBD oil and any drugs, such as THC, marijuana, and other forms of cannabis.) If you need to stop any medicine before surgery, ask when to start taking it again.  This is for your safety. Many medicines and drugs can make you bleed too much during surgery. Some change how well surgery (anesthesia) drugs work.  Call your insurance company to let them know you're having  surgery. (If you don't have insurance, call [phone].)  Call your clinic if there's any change in your health. This includes a scrape or scratch near the surgery site, or any signs of a cold (sore throat, runny nose, cough, rash, fever).  Eating and drinking guidelines  For your safety: Unless your surgeon tells you otherwise, follow the guidelines below.  Eat and drink as normal until 8 hours before you arrive for surgery. After that, no food or milk. You can spit out gum when you arrive.  Drink clear liquids until 2 hours before you arrive. These are liquids you can see through, like water, Gatorade, and Propel Water. They also include plain black coffee and tea (no cream or milk).  No alcohol for 24 hours before you arrive. The night before surgery, stop any drinks that contain THC.  If your care team tells you to take medicine on the morning of surgery, it's okay to take it with a sip of water. No other medicines or drugs are allowed (including CBD oil)--follow your care team's instructions.  If you have questions the day of surgery, call your hospital or surgery center.   Preventing infection  Shower or bathe the night before and the morning of surgery. Follow the instructions your clinic gave you. (If no instructions, use regular soap.)  Don't shave or clip hair near your surgery site. We'll remove the hair if needed.  Don't smoke or vape the morning of surgery. No chewing tobacco for 6 hours before you arrive. A nicotine patch is okay. You may spit out nicotine gum when you arrive.  For some surgeries, the surgeon will tell you to fully quit smoking and nicotine.  We will make every effort to keep you safe from infection. We will:  Clean our hands often with soap and water (or an alcohol-based hand rub).  Clean the skin at your surgery site with a special soap that kills germs.  Give you a special gown to keep you warm. (Cold raises the risk of infection.)  Wear hair covers, masks, gowns, and gloves  during surgery.  Give antibiotic medicine, if prescribed. Not all surgeries need this medicine.  What to bring on the day of surgery  Photo ID and insurance card  Copy of your health care directive, if you have one  Glasses and hearing aids (bring cases)  You can't wear contacts during surgery  Inhaler and eye drops, if you use them (tell us about these when you arrive)  CPAP machine or breathing device, if you use them  A few personal items, if spending the night  If you have . . .  A pacemaker, ICD (cardiac defibrillator), or other implant: Bring the ID card.  An implanted stimulator: Bring the remote control.  A legal guardian: Bring a copy of the certified (court-stamped) guardianship papers.  Please remove any jewelry, including body piercings. Leave jewelry and other valuables at home.  If you're going home the day of surgery  You must have a responsible adult drive you home. They should stay with you overnight as well.  If you don't have someone to stay with you, and you aren't safe to go home alone, we may keep you overnight. Insurance often won't pay for this.  After surgery  If it's hard to control your pain or you need more pain medicine, please call your surgeon's office.  Questions?   If you have any questions for your care team, list them here:   ____________________________________________________________________________________________________________________________________________________________________________________________________________________________________________________________  For informational purposes only. Not to replace the advice of your health care provider. Copyright   2003, 2019 [facility]. All rights reserved. Clinically reviewed by Zay Dodson MD. [employer] [id number] - REV 08/24.

## 2025-01-31 NOTE — PROGRESS NOTES
Preoperative Evaluation  [facility] [address] [place] [address]  Phone: [phone]  Fax: [phone]  Primary Provider: Nava Tran MD  Pre-op Performing Provider: Nava Tran MD  Jan 31, 2025 1/31/2025   Surgical Information   What procedure is being done? eye surgery    Facility or Hospital where procedure/surgery will be performed: [facility]    Who is doing the procedure / surgery? Yulisa Lewis MD    Date of surgery / procedure: 2/5/25    Time of surgery / procedure: arrive at 630 am    Where do you plan to recover after surgery? at home with family        Proxy-reported     Fax number for surgical facility: [phone]    Assessment & Plan     The proposed surgical procedure is considered LOW risk.    Preop general physical exam  Okay for low risk eye lid surgery 2/5/25 as scheduled.      Current moderate episode of major depressive disorder without prior episode (H)  PHQ-9 score:        1/31/2025     9:42 AM   PHQ   PHQ-9 Total Score 6   Q9: Thoughts of better off dead/self-harm past 2 weeks Not at all     Doing well per pt.  Continue duloxetine 60mg daily - unclear about adherence to this medication on regular basis.  Pt notes she takes at night.  Okay to take night before and day of surgery as normal.     Thrombocytopenia  Stable- mild.  Asymptomatic.  Not on asa or blood thinners.   - CBC with platelets    Graves disease  TSH   Date Value Ref Range Status   01/31/2025 1.16 0.30 - 4.20 uIU/mL Final     Well controlled on current does of methimazole.  Pt was suppose to take 15mg daily but indicates she is taking 10mg daily but on more regular basis now.    - PRIMARY CARE FOLLOW-UP SCHEDULING    Primary hypertension  BP Readings from Last 3 Encounters:   01/31/25 (!) 180/108   12/09/24 124/80   10/23/24 122/66        Uncontrolled today.  Plan for bloodpressure management includes ongoing focus on healthy DASH type diet and increased activity,  encouraged to avoid tobacco products and limit alcohol use, stress reduction, pt stopped her meds but will fill today and restart losartan/hydrochlorothiazide 50/12.5mg daily with goal of bp control by surgery next week.  Labwork and meds ordered and reviewed as below  Potassium   Date Value Ref Range Status   01/31/2025 3.8 3.4 - 5.3 mmol/L Final      Creatinine   Date Value Ref Range Status   01/31/2025 0.81 0.51 - 0.95 mg/dL Final        - losartan-hydrochlorothiazide (HYZAAR) 50-12.5 MG tablet  Dispense: 90 tablet; Refill: 1  - Comprehensive metabolic panel (BMP + Alb, Alk Phos, ALT, AST, Total. Bili, TP)              - No identified additional risk factors other than previously addressed    Antiplatelet or Anticoagulation Medication Instructions   - Patient is on no antiplatelet or anticoagulation medications.    Additional Medication Instructions  Take all scheduled medications on the day of surgery    Recommendation  Approval given to proceed with proposed procedure, without further diagnostic evaluation.    Natalia Gunter is a 59 year old, presenting for the following:  Pre-Op Exam (Pt is having eye surgery on both eyes.)          1/31/2025     9:39 AM   Additional Questions   Roomed by juliet   Accompanied by self     HPI related to upcoming procedure: getting oculoplastic surgery   Both eye lids at the same time.  Both eye lids are droopy and irritating and limiting her vision.  Working with [facility]    Not on any asa, ibuprofen, alleve, etc.      PHQ-9 score:        1/31/2025     9:42 AM   PHQ   PHQ-9 Total Score 6   Q9: Thoughts of better off dead/self-harm past 2 weeks Not at all   Duloxetine 60mg last filled 8/12/24 90 day supply   Mirtazapine 15mg 90 day filled 12/9/24 takes at bedtime     Has Graves disease with chronic hyperthyroidism- on methimazole 15mg daily that she takes at bedtime.  45 tabs filled 1/27/25 and 12/10/24.  Pt notes that she only takes 1 because she is feeling  better.      Vitamin D daily in the evening.      Unclear how many pills she is actually taking because she indicates she takes 4 daily.      Stopped her blood pressure med after her last visit because she said that her bp was getting low at home and so she stopped taking it several weeks before her last visit.  Has had high bp for a couple of years.  Agreed to start meds summer 2024 and started on losartan/hydrochlorothiazide 50/12.5mg daily and did well on this but bp maybe got too low?  Pt stopped and was told that her bp was okay. Willing to restart- did not have any problems on this medication.      Declines all vaccines recommended and reviewed today.          1/31/2025   Pre-Op Questionnaire   Have you ever had a heart attack or stroke? No    Have you ever had surgery on your heart or blood vessels, such as a stent placement, a coronary artery bypass, or surgery on an artery in your head, neck, heart, or legs? No    Do you have chest pain with activity? No    Do you have a history of heart failure? No    Do you currently have a cold, bronchitis or symptoms of other infection? No    Do you have a cough, shortness of breath, or wheezing? No    Do you or anyone in your family have previous history of blood clots? No    Do you or does anyone in your family have a serious bleeding problem such as prolonged bleeding following surgeries or cuts? No    Have you ever had problems with anemia or been told to take iron pills? No    Have you had any abnormal blood loss such as black, tarry or bloody stools, or abnormal vaginal bleeding? No    Have you ever had a blood transfusion? No    Are you willing to have a blood transfusion if it is medically needed before, during, or after your surgery? Yes    Have you or any of your relatives ever had problems with anesthesia? No    Do you have sleep apnea, excessive snoring or daytime drowsiness? No    Do you have any artifical heart valves or other implanted medical devices  like a pacemaker, defibrillator, or continuous glucose monitor? No    Do you have artificial joints? No    Are you allergic to latex? No        Proxy-reported     Health Care Directive  Patient does not have a Health Care Directive: Discussed advance care planning with patient; information given to patient to review.    Preoperative Review of   NA      Status of Chronic Conditions:  DEPRESSION - Patient has a long history of Depression of moderate severity requiring medication for control with recent symptoms being stable..Current symptoms of depression include depressed mood.     HYPERTENSION - Patient has longstanding history of HTN , currently denies any symptoms referable to elevated blood pressure. Specifically denies chest pain, palpitations, dyspnea, orthopnea, PND or peripheral edema. Blood pressure readings have not been in normal range. Current medication regimen is as listed below. Patient denies any side effects of medication.     Patient Active Problem List    Diagnosis Date Noted    Advanced directives, counseling/discussion 12/09/2024     Priority: Medium     No formal document- would trust any of her kids but not sure who would be best       Dermatochalasis of right upper eyelid 08/12/2024     Priority: Medium    Dry eye syndrome of both eyes 08/12/2024     Priority: Medium    Hyperopia of both eyes 08/12/2024     Priority: Medium    Nuclear sclerotic cataract of both eyes 08/12/2024     Priority: Medium    OAG (open angle glaucoma) suspect, low risk, bilateral 08/12/2024     Priority: Medium    Posterior vitreous detachment of right eye 08/12/2024     Priority: Medium    Refractive amblyopia, left eye 08/12/2024     Priority: Medium    RPE mottling of macula 08/12/2024     Priority: Medium    Prediabetes 07/13/2023     Priority: Medium    Primary hypertension 07/13/2023     Priority: Medium    Caregiver burden 04/10/2023     Priority: Medium    Thrombocytopenia 09/13/2022     Priority: Medium     Current moderate episode of major depressive disorder without prior episode (H) 2022     Priority: Medium    Insomnia, idiopathic 2022     Priority: Medium    Vitamin D deficiency 2022     Priority: Medium    Graves disease 10/04/2019     Priority: Medium      Past Medical History:   Diagnosis Date    Hyperthyroidism 10/03/2019    Paroxysmal atrial fibrillation (H)      Past Surgical History:   Procedure Laterality Date     SECTION      CHOLECYSTECTOMY       Current Outpatient Medications   Medication Sig Dispense Refill    acetaminophen (TYLENOL) 500 MG tablet       cetirizine (ZYRTEC) 10 MG tablet Take 1 tablet (10 mg) by mouth daily as needed for allergies 90 tablet 4    Cholecalciferol (VITAMIN D3) 50 MCG (2000 UT) CAPS Take 2,000 Units by mouth daily. 90 capsule 4    diclofenac (VOLTAREN) 1 % topical gel APPLY 2G TO AFFECTED AREA(S) THREE TIMES A DAY AS NEEDED FOR PAIN      DULoxetine (CYMBALTA) 60 MG capsule Take 1 capsule (60 mg) by mouth daily 90 capsule 1    fluticasone (FLONASE) 50 MCG/ACT nasal spray Spray 2 sprays into both nostrils daily 16 g 3    losartan-hydrochlorothiazide (HYZAAR) 50-12.5 MG tablet Take 1 tablet by mouth daily. 90 tablet 1    methimazole (TAPAZOLE) 10 MG tablet Take 1.5 tablets (15 mg) by mouth daily. 45 tablet 2    mirtazapine (REMERON) 15 MG tablet Take 1 tablet (15 mg) by mouth nightly as needed (sleep). 90 tablet 1    REFRESH RELIEVA 0.5-0.9 % ophthalmic solution Place 1 drop into both eyes 4 times daily as needed for dry eyes. 15 mL 11       No Known Allergies     Social History     Tobacco Use    Smoking status: Never     Passive exposure: Never    Smokeless tobacco: Never   Substance Use Topics    Alcohol use: Yes     Alcohol/week: 1.0 standard drink of alcohol     Types: 1 Cans of beer per week     Family History   Problem Relation Age of Onset    Other - See Comments Mother         abdominal pain    Other - See Comments Father          in  "[place] war    Other - See Comments Brother         fevers while in [place]     History   Drug Use Unknown             Review of Systems  Constitutional, neuro, ENT, endocrine, pulmonary, cardiac, gastrointestinal, genitourinary, musculoskeletal, integument and psychiatric systems are negative, except as otherwise noted.    Objective    BP (!) 180/108   Pulse 60   Temp 97.3  F (36.3  C) (Temporal)   Resp 16   Ht 1.53 m (5' 0.24\")   Wt 56.7 kg (125 lb)   LMP  (Exact Date)   SpO2 100%   BMI 24.22 kg/m     Estimated body mass index is 24.22 kg/m  as calculated from the following:    Height as of this encounter: 1.53 m (5' 0.24\").    Weight as of this encounter: 56.7 kg (125 lb).  Physical Exam  Complete 10 point ROS completed today as part of the exam and patient denies any symptoms as reviewed in HPI     Wt Readings from Last 3 Encounters:   01/31/25 56.7 kg (125 lb)   12/09/24 54.4 kg (120 lb)   10/23/24 57.2 kg (126 lb)       No LMP recorded (exact date). Patient is premenopausal.    All normal as below except abnormalities include: grossly normal exam- upper and lower eyelids droopy bilaterally likely interfering with visual fields. Remainder of exam is wnl.   General is a  59 year old sitting comfortably in no apparent distress   HEENT:  TM are clear bilaterally.  Eye exams within normal   Neck: Supple without lymphadenopathy or thyromegally  CV: Regular rate and rhythm S1S2 without rubs, murmurs or gallops,   Lungs: Clear to auscultation bilaterally  Abd:  +BS, soft NT/ND,  No masses or organomegally,   Extremities: Warm, No Edema, 2+ Pedal and radial pulses bilaterally  Skin: No lesions or rashes noted  Neuro: Able to ambulate around the exam room with equal movement, strength and normal coordination of the upper and lower extremeties symmetrically    Old Records-1: Outside allergies, meds, problems and immunizations were reconciled as needed from [facility]  Radiology tests reviewed-1: CXR 10/2024 " showing atypical infection in RLL  Lab tests reviewed-1: 2024  Medicine tests reviewed-1: EKG 10/2024 reviewed       Recent Labs   Lab Test 01/31/25  0921 12/09/24  1054 10/23/24  1311 08/12/24  1521   HGB 13.8 13.9 13.0 13.7   * 138* 146* 119*   NA  --  143 141 141   POTASSIUM  --  4.2 3.9 4.5   CR  --  0.63 0.55 0.74   A1C  --  5.6  --  5.4        Diagnostics  Recent Results (from the past week)   TSH with free T4 reflex    Collection Time: 01/31/25  9:21 AM   Result Value Ref Range    TSH 1.16 0.30 - 4.20 uIU/mL   Extra Purple Top EDTA (LAB USE ONLY)    Collection Time: 01/31/25  9:21 AM   Result Value Ref Range    Hold Specimen JIC    CBC with platelets    Collection Time: 01/31/25  9:21 AM   Result Value Ref Range    WBC Count 5.0 4.0 - 11.0 10e3/uL    RBC Count 4.81 3.80 - 5.20 10e6/uL    Hemoglobin 13.8 11.7 - 15.7 g/dL    Hematocrit 40.8 35.0 - 47.0 %    MCV 85 78 - 100 fL    MCH 28.7 26.5 - 33.0 pg    MCHC 33.8 31.5 - 36.5 g/dL    RDW 13.4 10.0 - 15.0 %    Platelet Count 136 (L) 150 - 450 10e3/uL   Comprehensive metabolic panel (BMP + Alb, Alk Phos, ALT, AST, Total. Bili, TP)    Collection Time: 01/31/25  9:21 AM   Result Value Ref Range    Sodium 142 135 - 145 mmol/L    Potassium 3.8 3.4 - 5.3 mmol/L    Carbon Dioxide (CO2) 28 22 - 29 mmol/L    Anion Gap 9 7 - 15 mmol/L    Urea Nitrogen 10.9 8.0 - 23.0 mg/dL    Creatinine 0.81 0.51 - 0.95 mg/dL    GFR Estimate 83 >60 mL/min/1.73m2    Calcium 10.2 8.8 - 10.4 mg/dL    Chloride 105 98 - 107 mmol/L    Glucose 136 (H) 70 - 99 mg/dL    Alkaline Phosphatase 79 40 - 150 U/L    AST 21 0 - 45 U/L    ALT 18 0 - 50 U/L    Protein Total 7.3 6.4 - 8.3 g/dL    Albumin 4.2 3.5 - 5.2 g/dL    Bilirubin Total 0.7 <=1.2 mg/dL      Interpretation ECG Sinus rhythm  Normal ECG  No previous ECGs available  Confirmed by SEE ED PROVIDER NOTE FOR, ECG INTERPRETATION ([id number]),  OLIVIA LEAVITT ([id number]) on 10/25/2024 1:31:00 AM                 Revised Cardiac Risk Index  (RCRI)  The patient has the following serious cardiovascular risks for perioperative complications:   - No serious cardiac risks = 0 points     RCRI Interpretation: 0 points: Class I (very low risk - 0.4% complication rate)         Signed Electronically by: Nava Tran MD  A copy of this evaluation report is provided to the requesting physician.         Answers submitted by the patient for this visit:  Patient Health Questionnaire (Submitted on 1/31/2025)  PHQ9 TOTAL SCORE: Incomplete

## 2025-01-31 NOTE — PROGRESS NOTES
Preoperative Evaluation  [facility] [address] [address] [place] [address]  Phone: [phone]  Fax: [phone]  Primary Provider: Nava Tran MD  Pre-op Performing Provider: Nava Tran MD  Jan 31, 2025   {Provider  Link to PREOP SmartSet  REQUIRED to apply standard patient instructions and medication directions to the AVS :955951}  {ROOMER review and update patient entered surgical information if needed :179184}        1/31/2025   Surgical Information   What procedure is being done? eye surgery    Facility or Hospital where procedure/surgery will be performed: [facility]    Who is doing the procedure / surgery? Yulisa Lewis MD    Date of surgery / procedure: 2/5/25    Time of surgery / procedure: arrive at 630 am    Where do you plan to recover after surgery? at home with family        Proxy-reported     Fax number for surgical facility: [phone]    Assessment & Plan     The proposed surgical procedure is considered LOW risk.    Preop general physical exam  ***    Current moderate episode of major depressive disorder without prior episode (H)  ***    Thrombocytopenia  ***  - CBC with platelets    Graves disease  ***  - PRIMARY CARE FOLLOW-UP SCHEDULING    Primary hypertension  ***  - losartan-hydrochlorothiazide (HYZAAR) 50-12.5 MG tablet  Dispense: 90 tablet; Refill: 1  - Comprehensive metabolic panel (BMP + Alb, Alk Phos, ALT, AST, Total. Bili, TP)             Risks and Recommendations  The patient has the following additional risks and recommendations for perioperative complications:   - No identified additional risk factors other than previously addressed    Preoperative Medication Instructions  Antiplatelet or Anticoagulation Medication Instructions   - Patient is on no antiplatelet or anticoagulation medications.    Additional Medication Instructions  Okay to take mental health medications on day before and day of surgery as normal.      Recommendation  Approval  given to proceed with proposed procedure, without further diagnostic evaluation.    Subjective   Norberto Gunter is a 59 year old, presenting for the following:  Pre-Op Exam (Pt is having eye surgery on both eyes.)          1/31/2025     9:39 AM   Additional Questions   Roomed by juliet   Accompanied by self     HPI related to upcoming procedure: getting oculoplastic surgery   Both eye lids at the same time.  Both eye lids are droopy and irritating and limiting her vision.  Working with [facility]    Not on any asa, ibuprofen, alleve, etc.      PHQ-9 score:        1/31/2025     9:42 AM   PHQ   PHQ-9 Total Score 6   Q9: Thoughts of better off dead/self-harm past 2 weeks Not at all   Duloxetine 60mg last filled 8/12/24 90 day supply   Mirtazapine 15mg 90 day filled 12/9/24 takes at bedtime     Has Graves disease with chronic hyperthyroidism- on methimazole 15mg daily that she takes at bedtime.  45 tabs filled 1/27/25 and 12/10/24.  Pt notes that she only takes 1 because she is feeling better.      Vitamin D daily in the evening.      Unclear how many pills she is actually taking because she indicates she takes 4 daily.      Stopped her blood pressure med after her last visit because she said that her bp was getting low at home and so she stopped taking it several weeks before her last visit.  Has had high bp for a couple of years.  Agreed to start meds summer 2024 and started on losartan/hydrochlorothiazide 50/12.5mg daily and did well on this but bp maybe got too low?  Pt stopped and was told that her bp was okay. Willing to restart- did not have any problems on this medication.      Declines all vaccines recommended and reviewed today.             1/31/2025   Pre-Op Questionnaire   Have you ever had a heart attack or stroke? No    Have you ever had surgery on your heart or blood vessels, such as a stent placement, a coronary artery bypass, or surgery on an artery in your head, neck, heart, or legs? No    Do you  have chest pain with activity? No    Do you have a history of heart failure? No    Do you currently have a cold, bronchitis or symptoms of other infection? No    Do you have a cough, shortness of breath, or wheezing? No    Do you or anyone in your family have previous history of blood clots? No    Do you or does anyone in your family have a serious bleeding problem such as prolonged bleeding following surgeries or cuts? No    Have you ever had problems with anemia or been told to take iron pills? No    Have you had any abnormal blood loss such as black, tarry or bloody stools, or abnormal vaginal bleeding? No    Have you ever had a blood transfusion? No    Are you willing to have a blood transfusion if it is medically needed before, during, or after your surgery? Yes    Have you or any of your relatives ever had problems with anesthesia? No    Do you have sleep apnea, excessive snoring or daytime drowsiness? No    Do you have any artifical heart valves or other implanted medical devices like a pacemaker, defibrillator, or continuous glucose monitor? No    Do you have artificial joints? No    Are you allergic to latex? No        Proxy-reported     Health Care Directive  Patient does not have a Health Care Directive: Discussed advance care planning with patient; information given to patient to review.    Preoperative Review of     {Review [place]MP for all patients [facility]:137188}    Status of Chronic Conditions:  DEPRESSION - Patient has a long history of Depression of moderate severity requiring medication for control with recent symptoms being stable..Current symptoms of depression include depressed mood.     Patient Active Problem List    Diagnosis Date Noted    Advanced directives, counseling/discussion 12/09/2024     Priority: Medium     No formal document- would trust any of her kids but not sure who would be best       Dermatochalasis of right upper eyelid 08/12/2024     Priority: Medium    Dry  eye syndrome of both eyes 2024     Priority: Medium    Hyperopia of both eyes 2024     Priority: Medium    Nuclear sclerotic cataract of both eyes 2024     Priority: Medium    OAG (open angle glaucoma) suspect, low risk, bilateral 2024     Priority: Medium    Posterior vitreous detachment of right eye 2024     Priority: Medium    Refractive amblyopia, left eye 2024     Priority: Medium    RPE mottling of macula 2024     Priority: Medium    Prediabetes 2023     Priority: Medium    Primary hypertension 2023     Priority: Medium    Caregiver burden 04/10/2023     Priority: Medium    Thrombocytopenia 2022     Priority: Medium    Current moderate episode of major depressive disorder without prior episode (H) 2022     Priority: Medium    Insomnia, idiopathic 2022     Priority: Medium    Vitamin D deficiency 2022     Priority: Medium    Graves disease 10/04/2019     Priority: Medium      Past Medical History:   Diagnosis Date    Hyperthyroidism 10/03/2019    Paroxysmal atrial fibrillation (H)      Past Surgical History:   Procedure Laterality Date     SECTION      CHOLECYSTECTOMY       Current Outpatient Medications   Medication Sig Dispense Refill    acetaminophen (TYLENOL) 500 MG tablet       cetirizine (ZYRTEC) 10 MG tablet Take 1 tablet (10 mg) by mouth daily as needed for allergies 90 tablet 4    Cholecalciferol (VITAMIN D3) 50 MCG (2000 UT) CAPS Take 2,000 Units by mouth daily. 90 capsule 4    diclofenac (VOLTAREN) 1 % topical gel APPLY 2G TO AFFECTED AREA(S) THREE TIMES A DAY AS NEEDED FOR PAIN      DULoxetine (CYMBALTA) 60 MG capsule Take 1 capsule (60 mg) by mouth daily 90 capsule 1    fluticasone (FLONASE) 50 MCG/ACT nasal spray Spray 2 sprays into both nostrils daily 16 g 3    losartan-hydrochlorothiazide (HYZAAR) 50-12.5 MG tablet Take 1 tablet by mouth daily. 90 tablet 1    methimazole (TAPAZOLE) 10 MG tablet Take 1.5 tablets  "(15 mg) by mouth daily. 45 tablet 2    mirtazapine (REMERON) 15 MG tablet Take 1 tablet (15 mg) by mouth nightly as needed (sleep). 90 tablet 1    REFRESH RELIEVA 0.5-0.9 % ophthalmic solution Place 1 drop into both eyes 4 times daily as needed for dry eyes. 15 mL 11       No Known Allergies     Social History     Tobacco Use    Smoking status: Never     Passive exposure: Never    Smokeless tobacco: Never   Substance Use Topics    Alcohol use: Yes     Alcohol/week: 1.0 standard drink of alcohol     Types: 1 Cans of beer per week     Family History   Problem Relation Age of Onset    Other - See Comments Mother         abdominal pain    Other - See Comments Father          in [place] war    Other - See Comments Brother         fevers while in [place]     History   Drug Use Unknown             Review of Systems  Constitutional, neuro, ENT, endocrine, pulmonary, cardiac, gastrointestinal, genitourinary, musculoskeletal, integument and psychiatric systems are negative, except as otherwise noted.    Objective    BP (!) 180/108   Pulse 60   Temp 97.3  F (36.3  C) (Temporal)   Resp 16   Ht 1.53 m (5' 0.24\")   Wt 56.7 kg (125 lb)   LMP  (Exact Date)   SpO2 100%   BMI 24.22 kg/m     Estimated body mass index is 24.22 kg/m  as calculated from the following:    Height as of this encounter: 1.53 m (5' 0.24\").    Weight as of this encounter: 56.7 kg (125 lb).  "

## 2025-02-03 ENCOUNTER — TELEPHONE (OUTPATIENT)
Dept: FAMILY MEDICINE | Facility: CLINIC | Age: 60
End: 2025-02-03
Payer: COMMERCIAL

## 2025-02-03 NOTE — TELEPHONE ENCOUNTER
General Call    Contacts       Contact Date/Time Type Contact Phone/Fax    02/03/2025 01:48 PM CST Phone (Incoming) [facility] (Other) [phone]          Reason for Call:  [facility]    What are your questions or concerns:  patient pre op phy notes on 01/31/2025 are not sign by Dr. Tran yet. Patient have eye surgery on 02/05/25 please sign notes.    Date of last appointment with provider: 01/31/2025    Could we send this information to you in [facility] or would you prefer to receive a phone call?:   No preference   Okay to leave a detailed message?: No at Home number on file [phone] (home) or Cell number on file:    Telephone Information:   Mobile [phone]

## 2025-02-04 ENCOUNTER — TELEPHONE (OUTPATIENT)
Dept: FAMILY MEDICINE | Facility: CLINIC | Age: 60
End: 2025-02-04
Payer: COMMERCIAL

## 2025-02-04 NOTE — TELEPHONE ENCOUNTER
RN called and relayed message below from Dr. Tran. Patient verbalized understanding and in agreement with plan.     Bud KING RN  [facility]

## 2025-02-04 NOTE — TELEPHONE ENCOUNTER
"Spoke with patient. She has started her losartan/hctz however does not have home BP monitor to recheck. Stated she was having dizziness before that she believed to be related to HTN but this has now resolved.     Eye surgery was canceled as insurance is not active. Postponed to 4/30/25. Has appt next week to complete paperwork for insurance renewal    BP recheck scheduled for March. She did not want to do this any sooner. Feels that she needs at least one month on medication for it to become fully effective. Despite discussion with RN, she states she knows herself and her body, doesn't feel that she is in the \"danger zone\" yet and is insistent on waiting one full month for follow up. She will call if she develops dizziness again or any other sx of HTN.     Pre-op rescheduled for 4/21/25 with Dr. Tran during week prior to new surgery date    Patient reports she was taking 1 tablet (10 mg) methimazole daily when labs were drawn on 1/31. She now is taking 1.5 tablets (15 mg) methimazole daily as prescribed/instructed during last office visit. Please advise if patient should return to 10 mg (1 tablet) daily as labs were WNL at that dose.     Future Appointments 2/4/2025 - 8/3/2025        Date Visit Type Length Department Provider     3/4/2025 11:30 AM RN VISIT 30 min [facility]/OB SPR[facility] RN 1    Location Instructions:     [facility] is located at [address] [place] [address] in [place], at the intersection of [facility]. This is one block south of the [facility]. Free parking is available in the lot directly north of the clinic across [facility]. The clinic is near stops along bus [address] and [address].              4/21/2025  9:00 AM PRE-OPERATIVE 40 min [facility]/OB Nava Tran MD    Location Instructions:     [facility] is located at [address] [place] [address] in [place], at the intersection of [facility]. This is one block south of the [facility] " [employer]. Free parking is available in the lot directly north of the clinic across [facility]. The clinic is near stops along bus [address] and [address].              5/21/2025  8:40 AM OFFICE VISIT 20 min [facility] FAMILY MEDICINE/Nava Palmer MD    Location Instructions:     [facility] is located at [address] [place] [address] in [place], at the intersection of [facility]. This is one block south of the [facility]. Free parking is available in the lot directly north of the clinic across [facility]. The clinic is near stops along bus [address] and [address].                         ----- Message from Nava Tran sent at 2/4/2025 10:34 AM CST -----  Team - please call patient with results and send result letter with this information if patient desires for their records. Refer to [facility] result note as needed.      Her thyroid level is healthy now- I think she is just taking 1 of her thyroid pills daily?     Did she  her bp med and start again?  Has she checked her bp today to make sure it is back down for surgery tomorrow?     Her liver and kidney tests are healthy  Her blood counts are healthy.

## 2025-03-26 ENCOUNTER — TRANSFERRED RECORDS (OUTPATIENT)
Dept: HEALTH INFORMATION MANAGEMENT | Facility: CLINIC | Age: 60
End: 2025-03-26
Payer: COMMERCIAL

## 2025-03-31 ENCOUNTER — PATIENT OUTREACH (OUTPATIENT)
Dept: CARE COORDINATION | Facility: CLINIC | Age: 60
End: 2025-03-31
Payer: COMMERCIAL

## 2025-04-21 ENCOUNTER — OFFICE VISIT (OUTPATIENT)
Dept: FAMILY MEDICINE | Facility: CLINIC | Age: 60
End: 2025-04-21
Payer: COMMERCIAL

## 2025-04-21 VITALS
TEMPERATURE: 97.8 F | HEART RATE: 80 BPM | RESPIRATION RATE: 20 BRPM | BODY MASS INDEX: 24.84 KG/M2 | SYSTOLIC BLOOD PRESSURE: 136 MMHG | DIASTOLIC BLOOD PRESSURE: 89 MMHG | OXYGEN SATURATION: 99 % | WEIGHT: 126.5 LBS | HEIGHT: 60 IN

## 2025-04-21 DIAGNOSIS — I10 PRIMARY HYPERTENSION: ICD-10-CM

## 2025-04-21 DIAGNOSIS — Z23 ENCOUNTER FOR IMMUNIZATION: ICD-10-CM

## 2025-04-21 DIAGNOSIS — D69.6 THROMBOCYTOPENIA: ICD-10-CM

## 2025-04-21 DIAGNOSIS — H02.403 PTOSIS OF BOTH EYELIDS: ICD-10-CM

## 2025-04-21 DIAGNOSIS — H25.13 NUCLEAR SCLEROTIC CATARACT OF BOTH EYES: ICD-10-CM

## 2025-04-21 DIAGNOSIS — E05.00 GRAVES DISEASE: ICD-10-CM

## 2025-04-21 DIAGNOSIS — R73.03 PREDIABETES: ICD-10-CM

## 2025-04-21 DIAGNOSIS — Z01.818 PREOP GENERAL PHYSICAL EXAM: Primary | ICD-10-CM

## 2025-04-21 LAB
ERYTHROCYTE [DISTWIDTH] IN BLOOD BY AUTOMATED COUNT: 12.1 % (ref 10–15)
EST. AVERAGE GLUCOSE BLD GHB EST-MCNC: 114 MG/DL
HBA1C MFR BLD: 5.6 % (ref 0–5.6)
HCT VFR BLD AUTO: 38.9 % (ref 35–47)
HGB BLD-MCNC: 13.3 G/DL (ref 11.7–15.7)
MCH RBC QN AUTO: 30.1 PG (ref 26.5–33)
MCHC RBC AUTO-ENTMCNC: 34.2 G/DL (ref 31.5–36.5)
MCV RBC AUTO: 88 FL (ref 78–100)
PLATELET # BLD AUTO: 149 10E3/UL (ref 150–450)
RBC # BLD AUTO: 4.42 10E6/UL (ref 3.8–5.2)
WBC # BLD AUTO: 4.6 10E3/UL (ref 4–11)

## 2025-04-21 PROCEDURE — T1013 SIGN LANG/ORAL INTERPRETER: HCPCS

## 2025-04-21 PROCEDURE — G2211 COMPLEX E/M VISIT ADD ON: HCPCS | Performed by: FAMILY MEDICINE

## 2025-04-21 PROCEDURE — 83036 HEMOGLOBIN GLYCOSYLATED A1C: CPT | Performed by: FAMILY MEDICINE

## 2025-04-21 PROCEDURE — 85027 COMPLETE CBC AUTOMATED: CPT | Performed by: FAMILY MEDICINE

## 2025-04-21 PROCEDURE — 36415 COLL VENOUS BLD VENIPUNCTURE: CPT | Performed by: FAMILY MEDICINE

## 2025-04-21 PROCEDURE — 3075F SYST BP GE 130 - 139MM HG: CPT | Performed by: FAMILY MEDICINE

## 2025-04-21 PROCEDURE — 3079F DIAST BP 80-89 MM HG: CPT | Performed by: FAMILY MEDICINE

## 2025-04-21 PROCEDURE — 80053 COMPREHEN METABOLIC PANEL: CPT | Performed by: FAMILY MEDICINE

## 2025-04-21 PROCEDURE — 99214 OFFICE O/P EST MOD 30 MIN: CPT | Performed by: FAMILY MEDICINE

## 2025-04-21 PROCEDURE — 84443 ASSAY THYROID STIM HORMONE: CPT | Performed by: FAMILY MEDICINE

## 2025-04-21 ASSESSMENT — PATIENT HEALTH QUESTIONNAIRE - PHQ9
10. IF YOU CHECKED OFF ANY PROBLEMS, HOW DIFFICULT HAVE THESE PROBLEMS MADE IT FOR YOU TO DO YOUR WORK, TAKE CARE OF THINGS AT HOME, OR GET ALONG WITH OTHER PEOPLE: VERY DIFFICULT
SUM OF ALL RESPONSES TO PHQ QUESTIONS 1-9: 11
SUM OF ALL RESPONSES TO PHQ QUESTIONS 1-9: 11

## 2025-04-21 NOTE — PROGRESS NOTES
Preoperative Evaluation  [facility] [address] [place] [address]  Phone: [phone]  Fax: [phone]  Primary Provider: Nava Tran MD  Pre-op Performing Provider: Nava Tran MD  Apr 21, 2025 4/21/2025   Surgical Information   What procedure is being done? Eye Lid surgery- bilateral upper and lower lids     Facility or Hospital where procedure/surgery will be performed: [facility]    Who is doing the procedure / surgery? Yulisa Lewis    Date of surgery / procedure: 4/30/2025    Time of surgery / procedure: TBD    Where do you plan to recover after surgery? at home with family        Proxy-reported     Fax number for surgical facility: [phone]     Assessment & Plan     The proposed surgical procedure is considered LOW risk.    Preop general physical exam    Nuclear sclerotic cataract of both eyes  Repair when indicated.     Primary hypertension  BP Readings from Last 3 Encounters:   04/21/25 136/89   01/31/25 (!) 180/108   12/09/24 124/80        controlled today.  Plan for bloodpressure management includes ongoing focus on healthy DASH type diet and increased activity, encouraged to avoid tobacco products and limit alcohol use, stress reduction, continue on losartan-hydrochlorothiazide 50/12.5mg daily okay to take on am of surgery with sip of water.   Labwork and meds ordered and reviewed as below  Potassium   Date Value Ref Range Status   04/21/2025 3.9 3.4 - 5.3 mmol/L Final      Creatinine   Date Value Ref Range Status   04/21/2025 0.72 0.51 - 0.95 mg/dL Final        - Comprehensive metabolic panel (BMP + Alb, Alk Phos, ALT, AST, Total. Bili, TP)  - Comprehensive metabolic panel (BMP + Alb, Alk Phos, ALT, AST, Total. Bili, TP)    Prediabetes  Well controlled with improved a1c  - Comprehensive metabolic panel (BMP + Alb, Alk Phos, ALT, AST, Total. Bili, TP)  - Hemoglobin A1c  - Comprehensive metabolic panel (BMP + Alb, Alk Phos,  ALT, AST, Total. Bili, TP)  - Hemoglobin A1c    Thrombocytopenia  Stable - asymptomatic   - CBC with platelets  - CBC with platelets    Graves disease  Stable on methimazole- was prescribed 15mg daily but pt has been taking 10mg daily but decreased to 5mg daily 2 weeks ago.  Okay to stay on 5mg daily and recheck at next visit, sooner if symptoms of palpitations return.    - TSH with free T4 reflex  - TSH with free T4 reflex    Encounter for immunization  Reviewed vaccines due- declining     Ptosis of both eyelids  Okay for surgical correction as scheduled     The longitudinal plan of care for the diagnosis(es)/condition(s) as documented were addressed during this visit. Due to the added complexity in care, I will continue to support Norberto Gunter in the subsequent management and with ongoing continuity of care.    Possible Sleep Apnea:        4/21/2025     9:38 AM   STOP-Bang Total Score   Total Score 2           - No identified additional risk factors other than previously addressed    Preoperative Medication Instructions  Antiplatelet or Anticoagulation Medication Instructions   - Bleeding risk is low for this procedure (e.g. dental, skin, cataract).    Additional Medication Instructions  Take all scheduled medications on the day of surgery    Recommendation  Approval given to proceed with proposed procedure, without further diagnostic evaluation.    Follow-up    Follow-up Visit   Expected date:  Aug 21, 2025 (Approximate)      Follow Up Appointment Details:     Follow-up with whom?: Me    Follow-Up for what?: Chronic Disease f/u    Chronic Disease f/u:  Depression  Hypothyroidism  Hypertension       How?: In Person                 Subjective   Norberto Gunter is a 59 year old, presenting for the following:  Pre-Op Exam          4/21/2025     8:31 AM   Additional Questions   Roomed by Jcarlos HENRIQUEZ   Accompanied by self     HPI: pt last seen 1/31/25 for preop for eye lid surery but had to cancel because lost insurance.  Has  insurance back again.      Eye lids both sides upper and lower lids are droopy and irritating her eyes and limiting her vision and ability to drive and ADLs, etc.  Medicare has approved eye lids surgery on bilateral upper lids and she is going to pay for the lower lids out of pocket.     Does snore but not loud.      Mental health- sees therapist monthly.  Duloxetine hasn't been filled since 8/2024- pt indicates she is taking daily.  Mirtazapine 15mg hs prn.      HTN- takes losartan-hydrochlorothiazide daily. Notes new formulation has a more bitter taste to it.       Graves disease with hyperthyroidism- on methimazole prescribed 15mg daily. Not having any more palpitations so decreased her dose to 5mg daily about 2 weeks ago prior to this was taking 10mg daily since January. Last visit was taking 2 pills daily and had normal test results.     Has dentist visit 4/23 for some molar pain on right side.               4/21/2025   Pre-Op Questionnaire   Have you ever had a heart attack or stroke? No    Have you ever had surgery on your heart or blood vessels, such as a stent placement, a coronary artery bypass, or surgery on an artery in your head, neck, heart, or legs? No    Do you have chest pain with activity? No    Do you have a history of heart failure? No    Do you currently have a cold, bronchitis or symptoms of other infection? No    Do you have a cough, shortness of breath, or wheezing? No    Do you or anyone in your family have previous history of blood clots? No    Do you or does anyone in your family have a serious bleeding problem such as prolonged bleeding following surgeries or cuts? No    Have you ever had problems with anemia or been told to take iron pills? No    Have you had any abnormal blood loss such as black, tarry or bloody stools, or abnormal vaginal bleeding? No    Have you ever had a blood transfusion? No    Are you willing to have a blood transfusion if it is medically needed before, during, or  after your surgery? Yes    Have you or any of your relatives ever had problems with anesthesia? No    Do you have sleep apnea, excessive snoring or daytime drowsiness? (!) UNKNOWN    Do you have any artifical heart valves or other implanted medical devices like a pacemaker, defibrillator, or continuous glucose monitor? No    Do you have artificial joints? No    Are you allergic to latex? No        Proxy-reported     Advance Care Planning    Discussed advance care planning with patient; informed AVS has link to [facility].    Preoperative Review of    reviewed - no record of controlled substances prescribed.      Status of Chronic Conditions:  HYPERTENSION - Patient has longstanding history of HTN , currently denies any symptoms referable to elevated blood pressure. Specifically denies chest pain, palpitations, dyspnea, orthopnea, PND or peripheral edema. Blood pressure readings have been in normal range. Current medication regimen is as listed below. Patient denies any side effects of medication.     Patient Active Problem List    Diagnosis Date Noted    Advanced directives, counseling/discussion 12/09/2024     Priority: Medium     No formal document- would trust any of her kids but not sure who would be best       Dermatochalasis of right upper eyelid 08/12/2024     Priority: Medium    Dry eye syndrome of both eyes 08/12/2024     Priority: Medium    Hyperopia of both eyes 08/12/2024     Priority: Medium    Nuclear sclerotic cataract of both eyes 08/12/2024     Priority: Medium    OAG (open angle glaucoma) suspect, low risk, bilateral 08/12/2024     Priority: Medium    Posterior vitreous detachment of right eye 08/12/2024     Priority: Medium    Refractive amblyopia, left eye 08/12/2024     Priority: Medium    RPE mottling of macula 08/12/2024     Priority: Medium    Prediabetes 07/13/2023     Priority: Medium    Primary hypertension 07/13/2023     Priority: Medium    Caregiver burden 04/10/2023      Priority: Medium    Thrombocytopenia 2022     Priority: Medium    Current moderate episode of major depressive disorder without prior episode (H) 2022     Priority: Medium    Insomnia, idiopathic 2022     Priority: Medium    Vitamin D deficiency 2022     Priority: Medium    Graves disease 10/04/2019     Priority: Medium      Past Medical History:   Diagnosis Date    Hyperthyroidism 10/03/2019    Paroxysmal atrial fibrillation (H)      Past Surgical History:   Procedure Laterality Date     SECTION      CHOLECYSTECTOMY       Current Outpatient Medications   Medication Sig Dispense Refill    acetaminophen (TYLENOL) 500 MG tablet       cetirizine (ZYRTEC) 10 MG tablet Take 1 tablet (10 mg) by mouth daily as needed for allergies 90 tablet 4    Cholecalciferol (VITAMIN D3) 50 MCG (2000 UT) CAPS Take 2,000 Units by mouth daily. 90 capsule 4    DULoxetine (CYMBALTA) 60 MG capsule Take 1 capsule (60 mg) by mouth daily 90 capsule 1    losartan-hydrochlorothiazide (HYZAAR) 50-12.5 MG tablet Take 1 tablet by mouth daily. 90 tablet 1    methimazole (TAPAZOLE) 10 MG tablet Take 0.5 tablets (5 mg) by mouth daily.      mirtazapine (REMERON) 15 MG tablet Take 1 tablet (15 mg) by mouth nightly as needed (sleep). 90 tablet 1    REFRESH RELIEVA 0.5-0.9 % ophthalmic solution Place 1 drop into both eyes 4 times daily as needed for dry eyes. 15 mL 11    diclofenac (VOLTAREN) 1 % topical gel APPLY 2G TO AFFECTED AREA(S) THREE TIMES A DAY AS NEEDED FOR PAIN      fluticasone (FLONASE) 50 MCG/ACT nasal spray Spray 2 sprays into both nostrils daily 16 g 3       No Known Allergies     Social History     Tobacco Use    Smoking status: Never     Passive exposure: Never    Smokeless tobacco: Never   Substance Use Topics    Alcohol use: Yes     Alcohol/week: 1.0 standard drink of alcohol     Types: 1 Cans of beer per week     Family History   Problem Relation Age of Onset    Other - See Comments Mother          abdominal pain    Other - See Comments Father          in [place] war    Other - See Comments Brother         fevers while in [place]     History   Drug Use Unknown             Review of Systems  Constitutional, neuro, ENT, endocrine, pulmonary, cardiac, gastrointestinal, genitourinary, musculoskeletal, integument and psychiatric systems are negative, except as otherwise noted.    Objective    /89 (BP Location: Right arm, Patient Position: Sitting, Cuff Size: Adult Regular)   Pulse 80   Temp 97.8  F (36.6  C) (Oral)   Resp 20   Ht 1.524 m (5')   Wt 57.4 kg (126 lb 8 oz)   SpO2 99%   BMI 24.71 kg/m     Estimated body mass index is 24.71 kg/m  as calculated from the following:    Height as of this encounter: 1.524 m (5').    Weight as of this encounter: 57.4 kg (126 lb 8 oz).  Physical Exam  Complete 10 point ROS completed today as part of the exam and patient denies any symptoms as reviewed in HPI     Wt Readings from Last 3 Encounters:   25 57.4 kg (126 lb 8 oz)   25 56.7 kg (125 lb)   24 54.4 kg (120 lb)       No LMP recorded. Patient is premenopausal.    All normal as below except abnormalities include: grossly normal exam - bilateral drooping eye lid on right and left side with mild redness of both sclera- chronic and stable for pt   General is a  59 year old sitting comfortably in no apparent distress   HEENT:  TM are clear bilaterally.  Eye exams within normal   Neck: Supple without lymphadenopathy or thyromegally  CV: Regular rate and rhythm S1S2 without rubs, murmurs or gallops,   Lungs: Clear to auscultation bilaterally  Abd:  +BS, soft NT/ND,  No masses or organomegally,   Extremities: Warm, No Edema, 2+ Pedal and radial pulses bilaterally  Skin: No lesions or rashes noted  Neuro: Able to ambulate around the exam room with equal movement, strength and normal coordination of the upper and lower extremeties symmetrically    Old Records-1: Outside allergies, meds, problems and  [facility]  Radiology tests reviewed-1: mammogram 2023  Lab tests reviewed-1: [id number]    Nava Tran MD     Recent Labs   Lab Test 01/31/25  0921 12/09/24  1054 10/23/24  1311 08/12/24  1521   HGB 13.8 13.9   < > 13.7   * 138*   < > 119*    143   < > 141   POTASSIUM 3.8 4.2   < > 4.5   CR 0.81 0.63   < > 0.74   A1C  --  5.6  --  5.4    < > = values in this interval not displayed.      Interpretation ECG Sinus rhythm  Normal ECG  No previous ECGs available  Confirmed by SEE ED PROVIDER NOTE FOR, ECG INTERPRETATION ([id number]),  OLIVIA LEAVITT ([id number]) on 10/25/2024 1:31:00 AM                 Diagnostics  Recent Results (from the past 240 hours)   CBC with platelets    Collection Time: 04/21/25  9:31 AM   Result Value Ref Range    WBC Count 4.6 4.0 - 11.0 10e3/uL    RBC Count 4.42 3.80 - 5.20 10e6/uL    Hemoglobin 13.3 11.7 - 15.7 g/dL    Hematocrit 38.9 35.0 - 47.0 %    MCV 88 78 - 100 fL    MCH 30.1 26.5 - 33.0 pg    MCHC 34.2 31.5 - 36.5 g/dL    RDW 12.1 10.0 - 15.0 %    Platelet Count 149 (L) 150 - 450 10e3/uL   TSH with free T4 reflex    Collection Time: 04/21/25  9:31 AM   Result Value Ref Range    TSH 0.75 0.30 - 4.20 uIU/mL   Comprehensive metabolic panel (BMP + Alb, Alk Phos, ALT, AST, Total. Bili, TP)    Collection Time: 04/21/25  9:31 AM   Result Value Ref Range    Sodium 140 135 - 145 mmol/L    Potassium 3.9 3.4 - 5.3 mmol/L    Carbon Dioxide (CO2) 27 22 - 29 mmol/L    Anion Gap 12 7 - 15 mmol/L    Urea Nitrogen 17.1 8.0 - 23.0 mg/dL    Creatinine 0.72 0.51 - 0.95 mg/dL    GFR Estimate >90 >60 mL/min/1.73m2    Calcium 10.0 8.8 - 10.4 mg/dL    Chloride 101 98 - 107 mmol/L    Glucose 124 (H) 70 - 99 mg/dL    Alkaline Phosphatase 67 40 - 150 U/L    AST 34 0 - 45 U/L    ALT 37 0 - 50 U/L    Protein Total 7.6 6.4 - 8.3 g/dL    Albumin 4.5 3.5 - 5.2 g/dL    Bilirubin Total 0.6 <=1.2 mg/dL   Hemoglobin A1c    Collection Time: 04/21/25  9:31 AM    Result Value Ref Range    Estimated Average Glucose 114 <117 mg/dL    Hemoglobin A1C 5.6 0.0 - 5.6 %      No EKG required for low risk surgery (cataract, skin procedure, breast biopsy, etc).    Revised Cardiac Risk Index (RCRI)  The patient has the following serious cardiovascular risks for perioperative complications:   - No serious cardiac risks = 0 points     RCRI Interpretation: 0 points: Class I (very low risk - 0.4% complication rate)         Signed Electronically by: Nava Tran MD  A copy of this evaluation report is provided to the requesting physician.         Answers submitted by the patient for this visit:  Patient Health Questionnaire (Submitted on 4/21/2025)  If you checked off any problems, how difficult have these problems made it for you to do your work, take care of things at home, or get along with other people?: Very difficult  PHQ9 TOTAL SCORE: 11

## 2025-04-21 NOTE — PATIENT INSTRUCTIONS
How to Take Your Medication Before Surgery  Preoperative Medication Instructions   Antiplatelet or Anticoagulation Medication Instructions   - Bleeding risk is low for this procedure (e.g. dental, skin, cataract).    Additional Medication Instructions  Take all scheduled medications on the day of surgery       Patient Education   Preparing for Your Surgery  For Adults  Getting started  In most cases, a nurse will call to review your health history and instructions. They will give you an arrival time based on your scheduled surgery time. Please be ready to share:  Your doctor's clinic name and phone number  Your medical, surgical, and anesthesia history  A list of allergies and sensitivities  A list of medicines, including herbal treatments and over-the-counter drugs  Whether the patient has a legal guardian (ask how to send us the papers in advance)  Note: You may not receive a call if you were seen at our PAC (Preoperative Assessment Center).  Please tell us if you're pregnant--or if there's any chance you might be pregnant. Some surgeries may injure a fetus (unborn baby), so they require a pregnancy test. Surgeries that are safe for a fetus don't always need a test, and you can choose whether to have one.   Preparing for surgery  Within 10 to 30 days of surgery: Have a pre-op exam (sometimes called an H&P, or History and Physical). This can be done at a clinic or pre-operative center.  If you're having a , you may not need this exam. Talk to your care team.  At your pre-op exam, talk to your care team about all medicines you take. (This includes CBD oil and any drugs, such as THC, marijuana, and other forms of cannabis.) If you need to stop any medicine before surgery, ask when to start taking it again.  This is for your safety. Many medicines and drugs can make you bleed too much during surgery. Some change how well surgery (anesthesia) drugs work.  Call your insurance company to let them know you're  having surgery. (If you don't have insurance, call [phone].)  Call your clinic if there's any change in your health. This includes a scrape or scratch near the surgery site, or any signs of a cold (sore throat, runny nose, cough, rash, fever).  Eating and drinking guidelines  For your safety: Unless your surgeon tells you otherwise, follow the guidelines below.  Eat and drink as normal until 8 hours before you arrive for surgery. After that, no food or milk. You can spit out gum when you arrive.  Drink clear liquids until 2 hours before you arrive. These are liquids you can see through, like water, Gatorade, and Propel Water. They also include plain black coffee and tea (no cream or milk).  No alcohol for 24 hours before you arrive. The night before surgery, stop any drinks that contain THC.  If your care team tells you to take medicine on the morning of surgery, it's okay to take it with a sip of water. No other medicines or drugs are allowed (including CBD oil)--follow your care team's instructions.  If you have questions the day of surgery, call your hospital or surgery center.   Preventing infection  Shower or bathe the night before and the morning of surgery. Follow the instructions your clinic gave you. (If no instructions, use regular soap.)  Don't shave or clip hair near your surgery site. We'll remove the hair if needed.  Don't smoke or vape the morning of surgery. No chewing tobacco for 6 hours before you arrive. A nicotine patch is okay. You may spit out nicotine gum when you arrive.  For some surgeries, the surgeon will tell you to fully quit smoking and nicotine.  We will make every effort to keep you safe from infection. We will:  Clean our hands often with soap and water (or an alcohol-based hand rub).  Clean the skin at your surgery site with a special soap that kills germs.  Give you a special gown to keep you warm. (Cold raises the risk of infection.)  Wear hair covers, masks, gowns, and  gloves during surgery.  Give antibiotic medicine, if prescribed. Not all surgeries need this medicine.  What to bring on the day of surgery  Photo ID and insurance card  Copy of your health care directive, if you have one  Glasses and hearing aids (bring cases)  You can't wear contacts during surgery  Inhaler and eye drops, if you use them (tell us about these when you arrive)  CPAP machine or breathing device, if you use them  A few personal items, if spending the night  If you have . . .  A pacemaker, ICD (cardiac defibrillator), or other implant: Bring the ID card.  An implanted stimulator: Bring the remote control.  A legal guardian: Bring a copy of the certified (court-stamped) guardianship papers.  Please remove any jewelry, including body piercings. Leave jewelry and other valuables at home.  If you're going home the day of surgery  You must have a responsible adult drive you home. They should stay with you overnight as well.  If you don't have someone to stay with you, and you aren't safe to go home alone, we may keep you overnight. Insurance often won't pay for this.  After surgery  If it's hard to control your pain or you need more pain medicine, please call your surgeon's office.  Questions?   If you have any questions for your care team, list them here:   ____________________________________________________________________________________________________________________________________________________________________________________________________________________________________________________________  For informational purposes only. Not to replace the advice of your health care provider. Copyright   2003, 2019 [facility]. All rights reserved. Clinically reviewed by Zay Dodson MD. [employer] [id number] - REV 08/24.

## 2025-04-21 NOTE — PROGRESS NOTES
Prior to immunization administration, verified patients identity using patient s name and date of birth. Please see Immunization Activity for additional information.     Screening Questionnaire for Adult Immunization    Are you sick today?   No   Do you have allergies to medications, food, a vaccine component or latex?   No   Have you ever had a serious reaction after receiving a vaccination?   No   Do you have a long-term health problem with heart, lung, kidney, or metabolic disease (e.g., diabetes), asthma, a blood disorder, no spleen, complement component deficiency, a cochlear implant, or a spinal fluid leak?  Are you on long-term aspirin therapy?   No   Do you have cancer, leukemia, HIV/AIDS, or any other immune system problem?   No   Do you have a parent, brother, or sister with an immune system problem?   No   In the past 3 months, have you taken medications that affect  your immune system, such as prednisone, other steroids, or anticancer drugs; drugs for the treatment of rheumatoid arthritis, Crohn s disease, or psoriasis; or have you had radiation treatments?   No   Have you had a seizure, or a brain or other nervous system problem?   No   During the past year, have you received a transfusion of blood or blood    products, or been given immune (gamma) globulin or antiviral drug?   No   For women: Are you pregnant or is there a chance you could become       pregnant during the next month?   No   Have you received any vaccinations in the past 4 weeks?   No     Immunization questionnaire answers were all negative.      Patient instructed to remain in clinic for 15 minutes afterwards, and to report any adverse reactions.     Screening performed by Jcarlos Felton MA on 4/21/2025 at 8:46 AM.        Answers submitted by the patient for this visit:  Patient Health Questionnaire (Submitted on 4/21/2025)  If you checked off any problems, how difficult have these problems made it for you to do your work, take care of  things at home, or get along with other people?: Very difficult  PHQ9 TOTAL SCORE: 11

## 2025-04-22 LAB
ALBUMIN SERPL BCG-MCNC: 4.5 G/DL (ref 3.5–5.2)
ALP SERPL-CCNC: 67 U/L (ref 40–150)
ALT SERPL W P-5'-P-CCNC: 37 U/L (ref 0–50)
ANION GAP SERPL CALCULATED.3IONS-SCNC: 12 MMOL/L (ref 7–15)
AST SERPL W P-5'-P-CCNC: 34 U/L (ref 0–45)
BILIRUB SERPL-MCNC: 0.6 MG/DL
BUN SERPL-MCNC: 17.1 MG/DL (ref 8–23)
CALCIUM SERPL-MCNC: 10 MG/DL (ref 8.8–10.4)
CHLORIDE SERPL-SCNC: 101 MMOL/L (ref 98–107)
CREAT SERPL-MCNC: 0.72 MG/DL (ref 0.51–0.95)
EGFRCR SERPLBLD CKD-EPI 2021: >90 ML/MIN/1.73M2
GLUCOSE SERPL-MCNC: 124 MG/DL (ref 70–99)
HCO3 SERPL-SCNC: 27 MMOL/L (ref 22–29)
POTASSIUM SERPL-SCNC: 3.9 MMOL/L (ref 3.4–5.3)
PROT SERPL-MCNC: 7.6 G/DL (ref 6.4–8.3)
SODIUM SERPL-SCNC: 140 MMOL/L (ref 135–145)
TSH SERPL DL<=0.005 MIU/L-ACNC: 0.75 UIU/ML (ref 0.3–4.2)

## 2025-04-23 RX ORDER — METHIMAZOLE 10 MG/1
5 TABLET ORAL DAILY
Status: SHIPPED
Start: 2025-04-23

## 2025-04-24 ENCOUNTER — TELEPHONE (OUTPATIENT)
Dept: FAMILY MEDICINE | Facility: CLINIC | Age: 60
End: 2025-04-24
Payer: COMMERCIAL

## 2025-04-24 NOTE — TELEPHONE ENCOUNTER
----- Message from Nava Marc sent at 4/23/2025 11:12 PM CDT -----  Team - please call patient with results and send result letter with this information if patient desires for their records. Refer to [employer] result note as needed.      Her thyroid is healthy - stay on 5mg daily and we will recheck next visit     Healthy liver and kidney tests     Healthy blood counts- no anemia     Normal sugars- no diabetes

## 2025-04-24 NOTE — TELEPHONE ENCOUNTER
Provider response below relayed to patient. Message understood. Use  line to contact patient :christen ID:[id number]

## 2025-06-20 ENCOUNTER — APPOINTMENT (OUTPATIENT)
Dept: INTERPRETER SERVICES | Facility: CLINIC | Age: 60
End: 2025-06-20
Payer: COMMERCIAL

## 2025-07-21 ENCOUNTER — PATIENT OUTREACH (OUTPATIENT)
Dept: CARE COORDINATION | Facility: CLINIC | Age: 60
End: 2025-07-21
Payer: COMMERCIAL

## 2025-07-22 ENCOUNTER — PATIENT OUTREACH (OUTPATIENT)
Dept: CARE COORDINATION | Facility: CLINIC | Age: 60
End: 2025-07-22
Payer: COMMERCIAL